# Patient Record
Sex: MALE | Race: OTHER | HISPANIC OR LATINO | Employment: FULL TIME | URBAN - METROPOLITAN AREA
[De-identification: names, ages, dates, MRNs, and addresses within clinical notes are randomized per-mention and may not be internally consistent; named-entity substitution may affect disease eponyms.]

---

## 2021-09-01 ENCOUNTER — TELEPHONE (OUTPATIENT)
Dept: PAIN MEDICINE | Facility: CLINIC | Age: 46
End: 2021-09-01

## 2021-09-01 VITALS
WEIGHT: 197 LBS | DIASTOLIC BLOOD PRESSURE: 93 MMHG | HEIGHT: 72 IN | BODY MASS INDEX: 26.68 KG/M2 | SYSTOLIC BLOOD PRESSURE: 146 MMHG | HEART RATE: 65 BPM

## 2021-09-01 DIAGNOSIS — M51.26 PROTRUSION OF LUMBAR INTERVERTEBRAL DISC: Primary | ICD-10-CM

## 2021-09-01 PROCEDURE — 99204 OFFICE O/P NEW MOD 45 MIN: CPT | Performed by: ORTHOPAEDIC SURGERY

## 2021-09-01 RX ORDER — PREDNISONE 20 MG/1
20 TABLET ORAL
Qty: 15 TABLET | Refills: 0 | Status: SHIPPED | OUTPATIENT
Start: 2021-09-01 | End: 2021-09-07 | Stop reason: ALTCHOICE

## 2021-09-01 NOTE — LETTER
September 1, 2021     Patient: Vance Robison   YOB: 1975   Date of Visit: 9/1/2021       To Whom it May Concern:    Vance Nipple is under my professional care  He was seen in my office on 9/1/2021  No work at this time  If you have any questions or concerns, please don't hesitate to call           Sincerely,          Delia Smith DO        CC: No Recipients

## 2021-09-01 NOTE — TELEPHONE ENCOUNTER
Pt sees Dr Félix Cuevas    Pt was referred to see Dr Alexandra Bo however next available is not until 10/4    Pt wife is requesting to know if Dr Félix Cuevas can prescribe something for him until the meantime    Please advise  Pt wife Rafael Perez can be reached at [phone]

## 2021-09-01 NOTE — TELEPHONE ENCOUNTER
I already gave him a steroid, lets see if that works  I do not prescribe anything stronger than what he has already been given

## 2021-09-01 NOTE — PROGRESS NOTES
Assessment/Plan:  1  Protrusion of lumbar intervertebral disc  predniSONE 20 mg tablet    Ambulatory referral to Pain Management       Jarrett Chandra has low back pain and left lumbar radiculopathy  His MRI demonstrates a disc protrusion at L4-5 and L5-S1  This correlates with his symptoms of S1 radiculopathy down the left leg  I recommended that he follow-up with pain management to consider a interventional treatment such as an epidural injection at this time  I gave him a note excusing him from work at this time  He will follow-up with pain management going forward and ultimately be released back to work under their guidance  Subjective:   Anselmo Dobbs is a 42 y o  male who presents this for evaluation for a low back injury  This is worker's compensation injury  He had a low back injury at work in early August when he was lifting heavy piece of equipment off of a truck  He felt a tearing sensation in his back  He had immediate pain and discomfort  He went to an urgent care where x-rays showed no clear abnormality  He eventually was sent for physical therapy and was given pain medication and nothing seemed to help  Therapy made his symptoms worse  He then had an MRI of the lumbar spine which was recently completed  He was referred for orthopedic evaluation as well  Today he has pain that is sharp and severe in the left lumbar paraspinal region  He states it radiates down the left leg into the heel on the left side  He occasionally gets stiffness and pain in the right side the left is much worse  He does admit to increased pain especially when trying to go to the bathroom and defecation is is particularly difficult  He has increased pain with this as well  He denies any incontinence however  He denies any saddle paresthesias  He does have a history of disc herniation and lumbar spine fusion at L5 S1-9 years ago    He states following surgery he had complete resolution of his symptoms that time until recently in the pain feels very similar  He describes a weakness and heaviness in his left leg that has not been relieved by any recent treatment with oral anti-inflammatories and therapy  Review of Systems   Constitutional: Negative for chills, fever and unexpected weight change  HENT: Negative for hearing loss, nosebleeds and sore throat  Eyes: Negative for pain, redness and visual disturbance  Respiratory: Negative for cough, shortness of breath and wheezing  Cardiovascular: Negative for chest pain, palpitations and leg swelling  Gastrointestinal: Negative for abdominal pain, nausea and vomiting  Endocrine: Negative for polyphagia and polyuria  Genitourinary: Negative for dysuria and hematuria  Musculoskeletal:        See HPI   Skin: Negative for rash and wound  Neurological: Positive for numbness  Negative for dizziness and headaches  Psychiatric/Behavioral: Negative for decreased concentration and suicidal ideas  The patient is not nervous/anxious  History reviewed  No pertinent past medical history  Past Surgical History:   Procedure Laterality Date    LUMBAR FUSION         History reviewed  No pertinent family history  Social History     Occupational History    Not on file   Tobacco Use    Smoking status: Current Every Day Smoker    Smokeless tobacco: Never Used    Tobacco comment: 1 pack a day    Vaping Use    Vaping Use: Never assessed   Substance and Sexual Activity    Alcohol use: Never    Drug use: Never    Sexual activity: Not on file         Current Outpatient Medications:     Naproxen Sodium (ALEVE PO), Take by mouth, Disp: , Rfl:     No Known Allergies    Objective:  Vitals:    09/01/21 1342   BP: 146/93   Pulse: 65       Back Exam     Tenderness   Back tenderness location: Tenderness to palpation over left lumbar paraspinal region      Range of Motion   Extension: normal   Flexion:  60 abnormal     Muscle Strength   Right Quadriceps: 5/5   Left Quadriceps:  4/5   Right Hamstrings:  5/5   Left Hamstrings:  4/5     Tests   Straight leg raise right: positive at 90 deg  Straight leg raise left: positive at 70 deg    Reflexes   Patellar reflexes: Patellar reflex 3/4 on the left and 2/4 on the right  Other   Sensation: decreased (Decreased sensation to light touch over left lower extremity compared to right)  Gait: normal   Erythema: no back redness            Physical Exam  Vitals reviewed  Constitutional:       Appearance: He is well-developed  HENT:      Head: Normocephalic and atraumatic  Eyes:      Conjunctiva/sclera: Conjunctivae normal       Pupils: Pupils are equal, round, and reactive to light  Cardiovascular:      Rate and Rhythm: Normal rate  Pulmonary:      Effort: Pulmonary effort is normal  No respiratory distress  Musculoskeletal:      Cervical back: Normal range of motion and neck supple  Lumbar back: Positive right straight leg raise test and positive left straight leg raise test       Comments: As noted in HPI   Skin:     General: Skin is warm and dry  Neurological:      Mental Status: He is alert and oriented to person, place, and time     Psychiatric:         Behavior: Behavior normal          I have personally reviewed pertinent films in PACS and my interpretation is as follows:  MRI of the lumbar spine demonstrates disc bulge at L4-5 and disc protrusion at L5-S1 compressing the L5 and S1 nerve roots on the left

## 2021-09-01 NOTE — TELEPHONE ENCOUNTER
Patient is being referred to [facility] by Dr Jack Kramer this is a Workmans Comp but patients personal Medical insurance does not Par [facility] with us  Fely Aquino is asking what can be done, because this is a workmans comp issue and patient needs to be seen ASAP      Please advise    [phone]

## 2021-09-02 NOTE — TELEPHONE ENCOUNTER
Tarik Crimes was given above information from Dr [facility] and verbalized understanding  Will call back if no improvement

## 2021-09-04 NOTE — TELEPHONE ENCOUNTER
Deborah Hendrix    [phone]    Patient states that he is having 9/10 lower back pain  He started Prednisone on Wednesday and it has not helped  Can something be called into [facility] @ [phone]    I sent a [place] Text to  on call

## 2021-09-08 ENCOUNTER — TELEPHONE (OUTPATIENT)
Dept: OBGYN CLINIC | Facility: HOSPITAL | Age: 46
End: 2021-09-08

## 2021-09-08 NOTE — TELEPHONE ENCOUNTER
Patient sees Dr Jayne Oswald  [employer] is calling for 9/1 office notes        Faxed to [phone] per request

## 2021-09-15 ENCOUNTER — TELEPHONE (OUTPATIENT)
Dept: OBGYN CLINIC | Facility: HOSPITAL | Age: 46
End: 2021-09-15

## 2021-09-15 NOTE — TELEPHONE ENCOUNTER
DR Clifford Holland  RE: Fax OVN [phone]    Office notes were e-faxed to Fernando Cuevas from [employer]    FAX# [phone]   #    [facility] # [phone]   [id number]

## 2021-10-04 ENCOUNTER — TELEPHONE (OUTPATIENT)
Dept: OBGYN CLINIC | Facility: HOSPITAL | Age: 46
End: 2021-10-04

## 2021-10-18 ENCOUNTER — CONSULT (OUTPATIENT)
Dept: PAIN MEDICINE | Facility: CLINIC | Age: 46
End: 2021-10-18
Payer: OTHER MISCELLANEOUS

## 2021-10-18 ENCOUNTER — TELEPHONE (OUTPATIENT)
Dept: PAIN MEDICINE | Facility: CLINIC | Age: 46
End: 2021-10-18

## 2021-10-18 VITALS
SYSTOLIC BLOOD PRESSURE: 150 MMHG | HEIGHT: 72 IN | WEIGHT: 208 LBS | BODY MASS INDEX: 28.17 KG/M2 | DIASTOLIC BLOOD PRESSURE: 102 MMHG | HEART RATE: 60 BPM

## 2021-10-18 DIAGNOSIS — M48.061 SPINAL STENOSIS OF LUMBAR REGION WITHOUT NEUROGENIC CLAUDICATION: ICD-10-CM

## 2021-10-18 DIAGNOSIS — M54.16 LUMBAR RADICULOPATHY: ICD-10-CM

## 2021-10-18 DIAGNOSIS — M96.1 LUMBAR POST-LAMINECTOMY SYNDROME: ICD-10-CM

## 2021-10-18 DIAGNOSIS — G89.4 CHRONIC PAIN SYNDROME: Primary | ICD-10-CM

## 2021-10-18 DIAGNOSIS — G89.29 CHRONIC LEFT-SIDED LOW BACK PAIN WITH LEFT-SIDED SCIATICA: ICD-10-CM

## 2021-10-18 DIAGNOSIS — M54.42 CHRONIC LEFT-SIDED LOW BACK PAIN WITH LEFT-SIDED SCIATICA: ICD-10-CM

## 2021-10-18 DIAGNOSIS — M48.062 SPINAL STENOSIS OF LUMBAR REGION WITH NEUROGENIC CLAUDICATION: Primary | ICD-10-CM

## 2021-10-18 PROCEDURE — 99244 OFF/OP CNSLTJ NEW/EST MOD 40: CPT | Performed by: ANESTHESIOLOGY

## 2021-10-18 RX ORDER — GABAPENTIN 300 MG/1
300 CAPSULE ORAL 3 TIMES DAILY
Qty: 90 CAPSULE | Refills: 0 | Status: SHIPPED | OUTPATIENT
Start: 2021-10-18 | End: 2021-11-08 | Stop reason: DRUGHIGH

## 2021-10-19 ENCOUNTER — TELEPHONE (OUTPATIENT)
Dept: PAIN MEDICINE | Facility: CLINIC | Age: 46
End: 2021-10-19

## 2021-10-19 DIAGNOSIS — M48.061 SPINAL STENOSIS OF LUMBAR REGION WITHOUT NEUROGENIC CLAUDICATION: ICD-10-CM

## 2021-10-19 DIAGNOSIS — M96.1 LUMBAR POST-LAMINECTOMY SYNDROME: ICD-10-CM

## 2021-10-19 DIAGNOSIS — M54.42 CHRONIC LEFT-SIDED LOW BACK PAIN WITH LEFT-SIDED SCIATICA: ICD-10-CM

## 2021-10-19 DIAGNOSIS — G89.4 CHRONIC PAIN SYNDROME: Primary | ICD-10-CM

## 2021-10-19 DIAGNOSIS — G89.29 CHRONIC LEFT-SIDED LOW BACK PAIN WITH LEFT-SIDED SCIATICA: ICD-10-CM

## 2021-10-19 DIAGNOSIS — M54.16 LUMBAR RADICULOPATHY: ICD-10-CM

## 2021-10-30 PROCEDURE — U0005 INFEC AGEN DETEC AMPLI PROBE: HCPCS | Performed by: ANESTHESIOLOGY

## 2021-10-30 PROCEDURE — U0003 INFECTIOUS AGENT DETECTION BY NUCLEIC ACID (DNA OR RNA); SEVERE ACUTE RESPIRATORY SYNDROME CORONAVIRUS 2 (SARS-COV-2) (CORONAVIRUS DISEASE [COVID-19]), AMPLIFIED PROBE TECHNIQUE, MAKING USE OF HIGH THROUGHPUT TECHNOLOGIES AS DESCRIBED BY CMS-2020-01-R: HCPCS | Performed by: ANESTHESIOLOGY

## 2021-11-05 ENCOUNTER — APPOINTMENT (OUTPATIENT)
Dept: RADIOLOGY | Facility: HOSPITAL | Age: 46
End: 2021-11-05
Payer: OTHER MISCELLANEOUS

## 2021-11-05 ENCOUNTER — HOSPITAL ENCOUNTER (OUTPATIENT)
Facility: AMBULARY SURGERY CENTER | Age: 46
Setting detail: OUTPATIENT SURGERY
Discharge: HOME | End: 2021-11-05
Attending: ANESTHESIOLOGY | Admitting: ANESTHESIOLOGY
Payer: OTHER MISCELLANEOUS

## 2021-11-05 VITALS
OXYGEN SATURATION: 99 % | RESPIRATION RATE: 18 BRPM | SYSTOLIC BLOOD PRESSURE: 138 MMHG | HEART RATE: 59 BPM | TEMPERATURE: 97.6 F | DIASTOLIC BLOOD PRESSURE: 95 MMHG

## 2021-11-05 PROCEDURE — 72020 X-RAY EXAM OF SPINE 1 VIEW: CPT

## 2021-11-05 PROCEDURE — 64484 NJX AA&/STRD TFRM EPI L/S EA: CPT | Performed by: ANESTHESIOLOGY

## 2021-11-05 PROCEDURE — 64483 NJX AA&/STRD TFRM EPI L/S 1: CPT | Performed by: ANESTHESIOLOGY

## 2021-11-05 RX ORDER — METHYLPREDNISOLONE ACETATE 80 MG/ML
INJECTION, SUSPENSION INTRA-ARTICULAR; INTRALESIONAL; INTRAMUSCULAR; SOFT TISSUE AS NEEDED
Status: DISCONTINUED | OUTPATIENT
Start: 2021-11-05 | End: 2021-11-05 | Stop reason: HOSPADM

## 2021-11-05 RX ORDER — BUPIVACAINE HYDROCHLORIDE 2.5 MG/ML
INJECTION, SOLUTION EPIDURAL; INFILTRATION; INTRACAUDAL AS NEEDED
Status: DISCONTINUED | OUTPATIENT
Start: 2021-11-05 | End: 2021-11-05 | Stop reason: HOSPADM

## 2021-11-05 RX ORDER — LIDOCAINE WITH 8.4% SOD BICARB 0.9%(10ML)
SYRINGE (ML) INJECTION AS NEEDED
Status: DISCONTINUED | OUTPATIENT
Start: 2021-11-05 | End: 2021-11-05 | Stop reason: HOSPADM

## 2021-11-08 RX ORDER — GABAPENTIN 600 MG/1
600 TABLET ORAL 3 TIMES DAILY
Qty: 90 TABLET | Refills: 0 | Status: SHIPPED | OUTPATIENT
Start: 2021-11-08 | End: 2021-12-02 | Stop reason: ALTCHOICE

## 2021-11-12 ENCOUNTER — TELEPHONE (OUTPATIENT)
Dept: PAIN MEDICINE | Facility: CLINIC | Age: 46
End: 2021-11-12

## 2021-11-12 NOTE — TELEPHONE ENCOUNTER
Pt reports no improvement post inj  Pain level 7-8/10  Pt aware I will call next week for an update

## 2021-11-18 NOTE — TELEPHONE ENCOUNTER
pts partner called stating that patient pain level is 8/10 and his legs are swollen and he cant sit down - pain increases with the slightest activity even with the increased dosage of gabentenpin

## 2021-11-19 NOTE — TELEPHONE ENCOUNTER
S/w pt, he said he is getting swelling in his leg (not b/l) and severe pain with any activity. RN advised pt that this would not be from the injection and most likely not due to the increase in the Gabapentin because it is only happening in one leg. Pt should report to ED for eval. Pt will f/u after.

## 2021-11-19 NOTE — TELEPHONE ENCOUNTER
I am not sure why his legs are swollen currently. It would not be from the injection. I think he should proceed to the ED to get ruled out for a blood clot.

## 2021-11-20 ENCOUNTER — HOSPITAL ENCOUNTER (EMERGENCY)
Facility: HOSPITAL | Age: 46
Discharge: HOME | End: 2021-11-20
Attending: EMERGENCY MEDICINE | Admitting: EMERGENCY MEDICINE
Payer: OTHER MISCELLANEOUS

## 2021-11-20 VITALS
DIASTOLIC BLOOD PRESSURE: 97 MMHG | WEIGHT: 215 LBS | RESPIRATION RATE: 18 BRPM | TEMPERATURE: 97.3 F | BODY MASS INDEX: 29.16 KG/M2 | SYSTOLIC BLOOD PRESSURE: 162 MMHG | OXYGEN SATURATION: 96 % | HEART RATE: 81 BPM

## 2021-11-20 DIAGNOSIS — M54.30 SCIATICA: Primary | ICD-10-CM

## 2021-11-20 LAB
ANION GAP SERPL CALCULATED.3IONS-SCNC: 9 MMOL/L (ref 4–13)
APTT PPP: 32 SECONDS (ref 23–37)
BASOPHILS # BLD AUTO: 0.06 THOUSANDS/ΜL (ref 0–0.1)
BASOPHILS NFR BLD AUTO: 1 % (ref 0–1)
BUN SERPL-MCNC: 26 MG/DL (ref 5–25)
CALCIUM SERPL-MCNC: 8.5 MG/DL (ref 8.3–10.1)
CHLORIDE SERPL-SCNC: 108 MMOL/L (ref 100–108)
CO2 SERPL-SCNC: 26 MMOL/L (ref 21–32)
CREAT SERPL-MCNC: 2.05 MG/DL (ref 0.6–1.3)
D DIMER PPP FEU-MCNC: <0.27 UG/ML FEU
EOSINOPHIL # BLD AUTO: 0.18 THOUSAND/ΜL (ref 0–0.61)
EOSINOPHIL NFR BLD AUTO: 2 % (ref 0–6)
ERYTHROCYTE [DISTWIDTH] IN BLOOD BY AUTOMATED COUNT: 13 % (ref 11.6–15.1)
GFR SERPL CREATININE-BSD FRML MDRD: 38 ML/MIN/1.73SQ M
GLUCOSE SERPL-MCNC: 100 MG/DL (ref 65–140)
HCT VFR BLD AUTO: 46 % (ref 36.5–49.3)
HGB BLD-MCNC: 15.9 G/DL (ref 12–17)
HOLD SPECIMEN: NORMAL
IMM GRANULOCYTES # BLD AUTO: 0.03 THOUSAND/UL (ref 0–0.2)
IMM GRANULOCYTES NFR BLD AUTO: 0 % (ref 0–2)
INR PPP: 0.93 (ref 0.84–1.19)
LYMPHOCYTES # BLD AUTO: 2.28 THOUSANDS/ΜL (ref 0.6–4.47)
LYMPHOCYTES NFR BLD AUTO: 24 % (ref 14–44)
MCH RBC QN AUTO: 31.7 PG (ref 26.8–34.3)
MCHC RBC AUTO-ENTMCNC: 34.6 G/DL (ref 31.4–37.4)
MCV RBC AUTO: 92 FL (ref 82–98)
MONOCYTES # BLD AUTO: 0.6 THOUSAND/ΜL (ref 0.17–1.22)
MONOCYTES NFR BLD AUTO: 6 % (ref 4–12)
NEUTROPHILS # BLD AUTO: 6.35 THOUSANDS/ΜL (ref 1.85–7.62)
NEUTS SEG NFR BLD AUTO: 67 % (ref 43–75)
NRBC BLD AUTO-RTO: 0 /100 WBCS
PLATELET # BLD AUTO: 224 THOUSANDS/UL (ref 149–390)
PMV BLD AUTO: 8.3 FL (ref 8.9–12.7)
POTASSIUM SERPL-SCNC: 4.4 MMOL/L (ref 3.5–5.3)
PROTHROMBIN TIME: 12.3 SECONDS (ref 11.6–14.5)
RBC # BLD AUTO: 5.01 MILLION/UL (ref 3.88–5.62)
SODIUM SERPL-SCNC: 143 MMOL/L (ref 136–145)
WBC # BLD AUTO: 9.5 THOUSAND/UL (ref 4.31–10.16)

## 2021-11-20 PROCEDURE — 99282 EMERGENCY DEPT VISIT SF MDM: CPT | Performed by: EMERGENCY MEDICINE

## 2021-11-20 PROCEDURE — 80048 BASIC METABOLIC PNL TOTAL CA: CPT | Performed by: EMERGENCY MEDICINE

## 2021-11-20 PROCEDURE — 85025 COMPLETE CBC W/AUTO DIFF WBC: CPT | Performed by: EMERGENCY MEDICINE

## 2021-11-20 PROCEDURE — 99284 EMERGENCY DEPT VISIT MOD MDM: CPT

## 2021-11-20 PROCEDURE — 85610 PROTHROMBIN TIME: CPT | Performed by: EMERGENCY MEDICINE

## 2021-11-20 PROCEDURE — 85379 FIBRIN DEGRADATION QUANT: CPT | Performed by: EMERGENCY MEDICINE

## 2021-11-20 PROCEDURE — 36415 COLL VENOUS BLD VENIPUNCTURE: CPT | Performed by: EMERGENCY MEDICINE

## 2021-11-20 PROCEDURE — 85730 THROMBOPLASTIN TIME PARTIAL: CPT | Performed by: EMERGENCY MEDICINE

## 2021-11-30 ENCOUNTER — TELEPHONE (OUTPATIENT)
Dept: PAIN MEDICINE | Facility: CLINIC | Age: 46
End: 2021-11-30

## 2021-12-02 ENCOUNTER — TELEPHONE (OUTPATIENT)
Dept: PAIN MEDICINE | Facility: CLINIC | Age: 46
End: 2021-12-02

## 2021-12-02 ENCOUNTER — OFFICE VISIT (OUTPATIENT)
Dept: PAIN MEDICINE | Facility: CLINIC | Age: 46
End: 2021-12-02
Payer: OTHER MISCELLANEOUS

## 2021-12-02 VITALS
WEIGHT: 216.6 LBS | BODY MASS INDEX: 29.34 KG/M2 | DIASTOLIC BLOOD PRESSURE: 87 MMHG | SYSTOLIC BLOOD PRESSURE: 129 MMHG | HEIGHT: 72 IN | HEART RATE: 85 BPM

## 2021-12-02 DIAGNOSIS — M54.42 CHRONIC LEFT-SIDED LOW BACK PAIN WITH LEFT-SIDED SCIATICA: ICD-10-CM

## 2021-12-02 DIAGNOSIS — G89.29 CHRONIC LEFT-SIDED LOW BACK PAIN WITH LEFT-SIDED SCIATICA: ICD-10-CM

## 2021-12-02 DIAGNOSIS — M48.062 SPINAL STENOSIS OF LUMBAR REGION WITH NEUROGENIC CLAUDICATION: ICD-10-CM

## 2021-12-02 DIAGNOSIS — M54.16 LUMBAR RADICULOPATHY: ICD-10-CM

## 2021-12-02 DIAGNOSIS — G89.4 CHRONIC PAIN SYNDROME: Primary | ICD-10-CM

## 2021-12-02 DIAGNOSIS — M96.1 LUMBAR POST-LAMINECTOMY SYNDROME: ICD-10-CM

## 2021-12-02 PROCEDURE — 99214 OFFICE O/P EST MOD 30 MIN: CPT | Performed by: ANESTHESIOLOGY

## 2021-12-02 RX ORDER — PREGABALIN 75 MG/1
75 CAPSULE ORAL 2 TIMES DAILY
Qty: 60 CAPSULE | Refills: 0 | Status: SHIPPED | OUTPATIENT
Start: 2021-12-02 | End: 2021-12-15 | Stop reason: ALTCHOICE

## 2021-12-23 PROCEDURE — U0003 INFECTIOUS AGENT DETECTION BY NUCLEIC ACID (DNA OR RNA); SEVERE ACUTE RESPIRATORY SYNDROME CORONAVIRUS 2 (SARS-COV-2) (CORONAVIRUS DISEASE [COVID-19]), AMPLIFIED PROBE TECHNIQUE, MAKING USE OF HIGH THROUGHPUT TECHNOLOGIES AS DESCRIBED BY CMS-2020-01-R: HCPCS | Performed by: ANESTHESIOLOGY

## 2021-12-23 PROCEDURE — U0005 INFEC AGEN DETEC AMPLI PROBE: HCPCS | Performed by: ANESTHESIOLOGY

## 2021-12-29 ENCOUNTER — HOSPITAL ENCOUNTER (OUTPATIENT)
Facility: AMBULARY SURGERY CENTER | Age: 46
Setting detail: OUTPATIENT SURGERY
Discharge: HOME | End: 2021-12-29
Attending: ANESTHESIOLOGY | Admitting: ANESTHESIOLOGY
Payer: OTHER MISCELLANEOUS

## 2021-12-29 ENCOUNTER — APPOINTMENT (OUTPATIENT)
Dept: RADIOLOGY | Facility: HOSPITAL | Age: 46
End: 2021-12-29
Payer: OTHER MISCELLANEOUS

## 2021-12-29 VITALS
SYSTOLIC BLOOD PRESSURE: 149 MMHG | OXYGEN SATURATION: 98 % | TEMPERATURE: 96.8 F | RESPIRATION RATE: 18 BRPM | DIASTOLIC BLOOD PRESSURE: 102 MMHG | HEIGHT: 72 IN | HEART RATE: 69 BPM | BODY MASS INDEX: 29.26 KG/M2 | WEIGHT: 216 LBS

## 2021-12-29 PROCEDURE — 72020 X-RAY EXAM OF SPINE 1 VIEW: CPT

## 2021-12-29 PROCEDURE — 64483 NJX AA&/STRD TFRM EPI L/S 1: CPT | Performed by: ANESTHESIOLOGY

## 2021-12-29 PROCEDURE — 64484 NJX AA&/STRD TFRM EPI L/S EA: CPT | Performed by: ANESTHESIOLOGY

## 2021-12-29 RX ORDER — BUPIVACAINE HYDROCHLORIDE 2.5 MG/ML
INJECTION, SOLUTION EPIDURAL; INFILTRATION; INTRACAUDAL AS NEEDED
Status: DISCONTINUED | OUTPATIENT
Start: 2021-12-29 | End: 2021-12-29 | Stop reason: HOSPADM

## 2021-12-29 RX ORDER — LIDOCAINE WITH 8.4% SOD BICARB 0.9%(10ML)
SYRINGE (ML) INJECTION AS NEEDED
Status: DISCONTINUED | OUTPATIENT
Start: 2021-12-29 | End: 2021-12-29 | Stop reason: HOSPADM

## 2021-12-29 RX ORDER — METHYLPREDNISOLONE ACETATE 80 MG/ML
INJECTION, SUSPENSION INTRA-ARTICULAR; INTRALESIONAL; INTRAMUSCULAR; SOFT TISSUE AS NEEDED
Status: DISCONTINUED | OUTPATIENT
Start: 2021-12-29 | End: 2021-12-29 | Stop reason: HOSPADM

## 2022-01-05 ENCOUNTER — TELEPHONE (OUTPATIENT)
Dept: PAIN MEDICINE | Facility: CLINIC | Age: 47
End: 2022-01-05

## 2022-02-03 ENCOUNTER — TELEPHONE (OUTPATIENT)
Dept: PAIN MEDICINE | Facility: CLINIC | Age: 47
End: 2022-02-03

## 2022-02-07 ENCOUNTER — OFFICE VISIT (OUTPATIENT)
Dept: PAIN MEDICINE | Facility: CLINIC | Age: 47
End: 2022-02-07
Payer: OTHER MISCELLANEOUS

## 2022-02-07 VITALS
SYSTOLIC BLOOD PRESSURE: 118 MMHG | BODY MASS INDEX: 30.61 KG/M2 | WEIGHT: 226 LBS | DIASTOLIC BLOOD PRESSURE: 79 MMHG | HEIGHT: 72 IN | HEART RATE: 99 BPM

## 2022-02-07 DIAGNOSIS — M96.1 LUMBAR POST-LAMINECTOMY SYNDROME: ICD-10-CM

## 2022-02-07 DIAGNOSIS — G89.4 CHRONIC PAIN SYNDROME: Primary | ICD-10-CM

## 2022-02-07 DIAGNOSIS — M54.16 LUMBAR RADICULOPATHY: ICD-10-CM

## 2022-02-07 DIAGNOSIS — M48.062 SPINAL STENOSIS OF LUMBAR REGION WITH NEUROGENIC CLAUDICATION: ICD-10-CM

## 2022-02-07 PROCEDURE — 99214 OFFICE O/P EST MOD 30 MIN: CPT | Performed by: NURSE PRACTITIONER

## 2022-02-07 NOTE — LETTER
February 7, 2022     Patient: Sara Danielson   YOB: 1975   Date of Visit: 2/7/2022       To Whom it May Concern:    Sara Danielson is under my professional care  He was seen in my office on 2/7/2022  He will continue to follow what he has been doing  If you have any questions or concerns, please don't hesitate to call           Sincerely,          PETRA TRIPP        CC: No Recipients

## 2022-02-07 NOTE — PROGRESS NOTES
Pain Medicine Follow-Up Note    Assessment:  1  Chronic pain syndrome    2  Lumbar post-laminectomy syndrome    3  Lumbar radiculopathy    4  Spinal stenosis of lumbar region with neurogenic claudication        Plan:  Orders Placed This Encounter   Procedures    Ambulatory referral to Neurosurgery     Standing Status:   Future     Standing Expiration Date:   2/7/2023     Referral Priority:   Routine     Referral Type:   Consult - AMB     Referral Reason:   Specialty Services Required     Requested Specialty:   Neurosurgery     Number of Visits Requested:   1     Expiration Date:   2/7/2023       No orders of the defined types were placed in this encounter  My impressions and treatment recommendations were discussed in detail with the patient who verbalized understanding and had no further questions  The patient was seen in the office today for follow-up after his left L5-S1 transforaminal epidural steroid injection on 12/29/2021  He states that unfortunately this injection did not do anything to help his pain symptoms  I did have a thorough discussion with the patient about a spinal cord stimulator trial and he was presented with information on the [employer] spinal cord stimulator to review at home  Initially, he stated that he did not want to pursue this as an option because he had a 3year-old daughter at home and could not be laid up for any length of time  He was started on Lyrica at the last office visit and says that he had to stop taking it because it was doing absolutely nothing to help this pain symptoms and it caused him to have lower extremity edema  The patient was also requesting a referral to Neurosurgery for re-evaluation of his chronic pain so 1 was provided to him at this visit  He will follow-up with Dr Beba George on an as-needed basis    And he will call the office if he decides to move forward with the spinal cord stimulator and I will put in an order for thoracic spine MRI and a referral for a psych eval     Follow-up is planned in as needed time or sooner as warranted  Discharge instructions were provided  I personally saw and examined the patient and I agree with the above discussed plan of care  History of Present Illness:    Shashank Cordova is a 42 y o  male who presents to [facility] for interval re-evaluation of the above stated pain complaints  The patient has a past medical and chronic pain history as outlined in the assessment section  He was last seen on 12/02/2021  He reports a pain score of 8-9/10 states that his pain is constant and worse in the morning  He describes the quality as sharp, shooting with numbness and pins and needles  States that his pain is in his low back and radiates down his left leg  Other than as stated above, the patient denies any interval changes in medications, medical condition, mental condition, symptoms, or allergies since the last office visit  Review of Systems:    Review of Systems   Respiratory: Negative for shortness of breath  Cardiovascular: Negative for chest pain  Gastrointestinal: Negative for constipation, diarrhea, nausea and vomiting  Musculoskeletal: Positive for gait problem  Negative for arthralgias, joint swelling and myalgias  Skin: Negative for rash  Neurological: Negative for dizziness, seizures and weakness  All other systems reviewed and are negative  Patient Active Problem List   Diagnosis    Chronic pain syndrome    Chronic left-sided low back pain with left-sided sciatica    Spinal stenosis of lumbar region with neurogenic claudication    Lumbar radiculopathy    Lumbar post-laminectomy syndrome       Past Medical History:   Diagnosis Date    Chronic back pain        Past Surgical History:   Procedure Laterality Date    EPIDURAL BLOCK INJECTION Left 11/5/2021    Procedure: L5 S1 transforaminal epdirual steroid injection ( [id number]);   Surgeon: Addie Mackey MD;  Location: [facility] MAIN OR;  Service: Pain Management     EPIDURAL BLOCK INJECTION Left 12/29/2021    Procedure: L5 S1 transforaminal epidural steroid injection ( [id number]); Surgeon: Addie Mackey MD;  Location: [facility] MAIN OR;  Service: Pain Management     LUMBAR FUSION         History reviewed  No pertinent family history  Social History     Occupational History    Not on file   Tobacco Use    Smoking status: Current Every Day Smoker     Packs/day: 1 00    Smokeless tobacco: Never Used    Tobacco comment: 1 pack a day    Vaping Use    Vaping Use: Never used   Substance and Sexual Activity    Alcohol use: Never    Drug use: Never    Sexual activity: Not on file       No current outpatient medications on file  No Known Allergies    Physical Exam:    /79   Pulse 99   Ht 6' (1 829 m)   Wt 103 kg (226 lb)   BMI 30 65 kg/m²     Constitutional:normal, well developed, well nourished, alert, in no distress and non-toxic and no overt pain behavior    Eyes:anicteric  HEENT:grossly intact  Neck:supple, symmetric, trachea midline and no masses   Pulmonary:even and unlabored  Cardiovascular:No edema or pitting edema present  Skin:Normal without rashes or lesions and well hydrated  Psychiatric:Mood and affect appropriate  Neurologic:Cranial Nerves II-XII grossly intact  Musculoskeletal:antalgic      Imaging  No orders to display         Orders Placed This Encounter   Procedures    Ambulatory referral to Neurosurgery

## 2022-02-08 DIAGNOSIS — M51.16 LUMBAR DISC DISEASE WITH RADICULOPATHY: Primary | ICD-10-CM

## 2022-02-08 RX ORDER — DULOXETIN HYDROCHLORIDE 30 MG/1
30 CAPSULE, DELAYED RELEASE ORAL DAILY
Qty: 30 CAPSULE | Refills: 1 | Status: SHIPPED | OUTPATIENT
Start: 2022-02-08

## 2022-02-08 NOTE — TELEPHONE ENCOUNTER
pts partner called asking for pain medication for the pain in his back  to be called into [facility] DR ROXANNE ESCAMILLA on file- thank you      [phone]

## 2022-02-08 NOTE — TELEPHONE ENCOUNTER
Spoke to pt, advised the same  Writing nurse reviewed s/e of medication as well  Pt verbalized understanding of instructions

## 2022-02-08 NOTE — TELEPHONE ENCOUNTER
Spoke to pt regarding pain in lower back traveling down left leg  Pt stated that he has tried naproxen, gabapentin, lyrica as well as heat with no relief  Pt stated he was seen in office on 2/7 and is not interested in STIM at this time  Please advise

## 2022-02-08 NOTE — TELEPHONE ENCOUNTER
Please let the patient know that I sent a prescription for duloxetine 30 mg to the pharmacy on file  Please review side effects with him and make sure he knows that this is a starting dose and that he needs to be titrated up to a dose that will be most effective in helping his radiating leg pain

## 2022-05-16 ENCOUNTER — APPOINTMENT (EMERGENCY)
Dept: RADIOLOGY | Facility: HOSPITAL | Age: 47
End: 2022-05-16
Payer: COMMERCIAL

## 2022-05-16 ENCOUNTER — HOSPITAL ENCOUNTER (EMERGENCY)
Facility: HOSPITAL | Age: 47
Discharge: HOME | End: 2022-05-16
Attending: EMERGENCY MEDICINE
Payer: COMMERCIAL

## 2022-05-16 VITALS
SYSTOLIC BLOOD PRESSURE: 118 MMHG | DIASTOLIC BLOOD PRESSURE: 73 MMHG | WEIGHT: 222.8 LBS | TEMPERATURE: 98.7 F | HEART RATE: 95 BPM | OXYGEN SATURATION: 95 % | RESPIRATION RATE: 18 BRPM | BODY MASS INDEX: 30.22 KG/M2

## 2022-05-16 DIAGNOSIS — U07.1 COVID-19: Primary | ICD-10-CM

## 2022-05-16 LAB
FLUAV RNA RESP QL NAA+PROBE: NEGATIVE
FLUBV RNA RESP QL NAA+PROBE: NEGATIVE
RSV RNA RESP QL NAA+PROBE: NEGATIVE
SARS-COV-2 RNA RESP QL NAA+PROBE: POSITIVE

## 2022-05-16 PROCEDURE — 0241U HB NFCT DS VIR RESP RNA 4 TRGT: CPT | Performed by: EMERGENCY MEDICINE

## 2022-05-16 PROCEDURE — 99284 EMERGENCY DEPT VISIT MOD MDM: CPT | Performed by: EMERGENCY MEDICINE

## 2022-05-16 PROCEDURE — 94640 AIRWAY INHALATION TREATMENT: CPT

## 2022-05-16 PROCEDURE — 71045 X-RAY EXAM CHEST 1 VIEW: CPT

## 2022-05-16 PROCEDURE — 99285 EMERGENCY DEPT VISIT HI MDM: CPT

## 2022-05-16 RX ORDER — PREDNISONE 20 MG/1
60 TABLET ORAL ONCE
Status: COMPLETED | OUTPATIENT
Start: 2022-05-16 | End: 2022-05-16

## 2022-05-16 RX ORDER — IPRATROPIUM BROMIDE AND ALBUTEROL SULFATE 2.5; .5 MG/3ML; MG/3ML
3 SOLUTION RESPIRATORY (INHALATION) ONCE
Status: COMPLETED | OUTPATIENT
Start: 2022-05-16 | End: 2022-05-16

## 2022-05-16 RX ORDER — ALBUTEROL SULFATE 90 UG/1
2 AEROSOL, METERED RESPIRATORY (INHALATION) EVERY 4 HOURS PRN
Qty: 18 G | Refills: 0 | Status: SHIPPED | OUTPATIENT
Start: 2022-05-16

## 2022-05-16 RX ORDER — ACETAMINOPHEN 325 MG/1
650 TABLET ORAL ONCE
Status: COMPLETED | OUTPATIENT
Start: 2022-05-16 | End: 2022-05-16

## 2022-05-16 RX ORDER — PREDNISONE 20 MG/1
40 TABLET ORAL DAILY
Qty: 10 TABLET | Refills: 0 | Status: SHIPPED | OUTPATIENT
Start: 2022-05-16 | End: 2022-05-21

## 2022-05-16 RX ADMIN — IPRATROPIUM BROMIDE AND ALBUTEROL SULFATE 3 ML: 2.5; .5 SOLUTION RESPIRATORY (INHALATION) at 20:19

## 2022-05-16 RX ADMIN — ACETAMINOPHEN 650 MG: 325 TABLET ORAL at 20:16

## 2022-05-16 RX ADMIN — PREDNISONE 60 MG: 20 TABLET ORAL at 21:49

## 2022-05-17 ENCOUNTER — TELEPHONE (OUTPATIENT)
Dept: FAMILY MEDICINE CLINIC | Facility: CLINIC | Age: 47
End: 2022-05-17

## 2022-05-17 NOTE — ED PROVIDER NOTES
History  Chief Complaint   Patient presents with    Shortness of Breath     States SOB, chills, that started yesterday  States he has a smokers cough  Temp 100 6 in triage  Patient presents for evaluation of shortness of breath and chills starting yesterday  Patient states he has a cough with calls a smoker's cough  Was unaware they have fever  Patient feels some tightness in his chest as well  Patient is not vaccinated  Patient is a daily smoker  History provided by:  Patient   used: No    Shortness of Breath  Associated symptoms: cough and fever    Associated symptoms: no abdominal pain, no chest pain, no ear pain, no rash, no sore throat and no vomiting        Prior to Admission Medications   Prescriptions Last Dose Informant Patient Reported? Taking? DULoxetine (CYMBALTA) 30 mg delayed release capsule   No No   Sig: Take 1 capsule (30 mg total) by mouth daily      Facility-Administered Medications: None       Past Medical History:   Diagnosis Date    Chronic back pain        Past Surgical History:   Procedure Laterality Date    EPIDURAL BLOCK INJECTION Left 11/5/2021    Procedure: L5 S1 transforaminal epdirual steroid injection ( [id number]); Surgeon: Tiny Romberg, MD;  [place] [facility] [place];  Service: Pain Management     EPIDURAL BLOCK INJECTION Left 12/29/2021    Procedure: L5 S1 transforaminal epidural steroid injection ( [id number]); Surgeon: Tiny Romberg, MD;  Location: [facility] OR;  Service: Pain Management     LUMBAR FUSION         History reviewed  No pertinent family history  I have reviewed and agree with the history as documented      E-Cigarette/Vaping    E-Cigarette Use Never User      E-Cigarette/Vaping Substances    Nicotine No     THC No     CBD No     Flavoring No     Other No     Unknown No      Social History     Tobacco Use    Smoking status: Current Every Day Smoker     Packs/day: 1 00    Smokeless tobacco: Never Used    Tobacco comment: 1 pack a day    Vaping Use    Vaping Use: Never used   Substance Use Topics    Alcohol use: Never    Drug use: Never       Review of Systems   Constitutional: Positive for chills and fever  HENT: Positive for congestion  Negative for ear pain and sore throat  Eyes: Negative for pain and visual disturbance  Respiratory: Positive for cough, chest tightness and shortness of breath  Cardiovascular: Negative for chest pain and palpitations  Gastrointestinal: Negative for abdominal pain and vomiting  Genitourinary: Negative for dysuria and hematuria  Musculoskeletal: Negative for arthralgias and back pain  Skin: Negative for color change and rash  Neurological: Negative for seizures and syncope  All other systems reviewed and are negative  Physical Exam  Physical Exam  Vitals and nursing note reviewed  Constitutional:       General: He is not in acute distress  HENT:      Head: Atraumatic  Right Ear: External ear normal       Left Ear: External ear normal       Nose: Nose normal       Mouth/Throat:      Mouth: Mucous membranes are moist       Pharynx: Oropharynx is clear  Eyes:      General: No scleral icterus  Conjunctiva/sclera: Conjunctivae normal    Cardiovascular:      Rate and Rhythm: Normal rate and regular rhythm  Pulses: Normal pulses  Pulmonary:      Effort: Pulmonary effort is normal  No respiratory distress  Breath sounds: Wheezing present  Comments: Bilateral expiratory wheezing  Abdominal:      General: Abdomen is flat  Bowel sounds are normal  There is no distension  Palpations: Abdomen is soft  Tenderness: There is no abdominal tenderness  There is no guarding or rebound  Musculoskeletal:         General: No deformity  Normal range of motion  Skin:     Capillary Refill: Capillary refill takes less than 2 seconds  Findings: No rash  Neurological:      General: No focal deficit present        Mental Status: He is alert and oriented to person, place, and time  Vital Signs  ED Triage Vitals [05/16/22 1939]   Temperature Pulse Respirations Blood Pressure SpO2   (!) 100 6 °F (38 1 °C) 82 20 132/91 98 %      Temp Source Heart Rate Source Patient Position - Orthostatic VS BP Location FiO2 (%)   Tympanic Monitor Sitting Left arm --      Pain Score       --           Vitals:    05/16/22 1939 05/16/22 2151   BP: 132/91 118/73   Pulse: 82 95   Patient Position - Orthostatic VS: Sitting Sitting         Visual Acuity      ED Medications  Medications   ipratropium-albuterol (DUO-NEB) 0 5-2 5 mg/3 mL inhalation solution 3 mL (3 mL Nebulization Given 5/16/22 2019)   acetaminophen (TYLENOL) tablet 650 mg (650 mg Oral Given 5/16/22 2016)   predniSONE tablet 60 mg (60 mg Oral Given 5/16/22 2149)       Diagnostic Studies  Results Reviewed     Procedure Component Value Units Date/Time    COVID/FLU/RSV - 2 hour TAT [[id number]]  (Abnormal) Collected: 05/16/22 2015    Lab Status: Final result Specimen: Nares from Nasopharyngeal Swab Updated: 05/16/22 2105     SARS-CoV-2 Positive     INFLUENZA A PCR Negative     INFLUENZA B PCR Negative     RSV PCR Negative    Narrative:      FOR PEDIATRIC PATIENTS - copy/paste COVID Guidelines URL to browser: [email or web address]    SARS-CoV-2 assay is a Nucleic Acid Amplification assay intended for the  qualitative detection of nucleic acid from SARS-CoV-2 in nasopharyngeal  swabs  Results are for the presumptive identification of SARS-CoV-2 RNA  Positive results are indicative of infection with SARS-CoV-2, the virus  causing COVID-19, but do not rule out bacterial infection or co-infection  with other viruses  Laboratories within the [place] and its  territories are required to report all positive results to the appropriate  public health authorities   Negative results do not preclude SARS-CoV-2  infection and should not be used as the sole basis for treatment or other  patient management decisions  Negative results must be combined with  clinical observations, patient history, and epidemiological information  This test has not been FDA cleared or approved  This test has been authorized by FDA under an Emergency Use Authorization  (EUA)  This test is only authorized for the duration of time the  declaration that circumstances exist justifying the authorization of the  emergency use of an in vitro diagnostic tests for detection of SARS-CoV-2  virus and/or diagnosis of COVID-19 infection under section 564(b)(1) of  the Act, 21 U  S C  194XKJ-5(G)(9), unless the authorization is terminated  or revoked sooner  The test has been validated but independent review by FDA  and CLIA is pending  Test performed using [employer] GeneXpert: This RT-PCR assay targets N2,  a region unique to SARS-CoV-2  A conserved region in the E-gene was chosen  for pan-Sarbecovirus detection which includes SARS-CoV-2  XR chest 1 view portable   Final Result by Muriel Webb MD (05/16 2133)      No acute cardiopulmonary disease  Workstation performed: [id number]                    Procedures  Procedures         ED Course                                             MDM  Number of Diagnoses or Management Options  COVID-19  Diagnosis management comments: Pulse ox 95% on room air indicating adequate oxygenation  CXR: NAD as read by me    Patient informed of positive COVID test results  On repeat exam the tightness in chest improved  Patient's oxygen saturation was 95%  Will refer the COVID follow-up team   Discussed CDC guidelines for isolation           Amount and/or Complexity of Data Reviewed  Clinical lab tests: ordered and reviewed  Tests in the radiology section of CPT®: ordered and reviewed  Decide to obtain previous medical records or to obtain history from someone other than the patient: yes  Review and summarize past medical records: yes  Independent visualization of images, tracings, or specimens: yes    Patient Progress  Patient progress: improved      Disposition  Final diagnoses:   COVID-19     Time reflects when diagnosis was documented in both MDM as applicable and the Disposition within this note     Time User Action Codes Description Comment    5/16/2022  9:14 PM Ema Mckee Add [U07 1] COVID-19       ED Disposition     ED Disposition   Discharge    Condition   Stable    Date/Time   Mon May 16, 2022  9:14 PM    [address] discharge to home/self care  Follow-up Information     Follow up With Specialties Details Why Contact Info Additional Information    [employer]  In 1 week [phone]       [address] [facility] Emergency Department Emergency Medicine  If symptoms worsen [address] [facility] [address]  [address] [address] Emergency Department, [place], [place], [place], [address]          Discharge Medication List as of 5/16/2022  9:16 PM      START taking these medications    Details   albuterol (PROVENTIL HFA,VENTOLIN HFA) 90 mcg/act inhaler Inhale 2 puffs every 4 (four) hours as needed for wheezing or shortness of breath, Starting Mon 5/16/2022, Normal      predniSONE 20 mg tablet Take 2 tablets (40 mg total) by mouth in the morning for 5 days  , Starting Mon 5/16/2022, Until Sat 5/21/2022, Normal         CONTINUE these medications which have NOT CHANGED    Details   DULoxetine (CYMBALTA) 30 mg delayed release capsule Take 1 capsule (30 mg total) by mouth daily, Starting Tue 2/8/2022, Normal             No discharge procedures on file      PDMP Review     None          ED Provider  Electronically Signed by           Skyler Araujo DO  05/16/22 0524

## 2022-05-17 NOTE — TELEPHONE ENCOUNTER
Spoke with patient, introduced myself and purpose of call  Patient states he received an inhaler and steroid at th ED yesterday and he feels like he does not need to follow up  Stressed the importance to patient, but denied  I made patient aware what symptoms to look out for and to visit ED if he worsens

## 2022-06-04 ENCOUNTER — OFFICE VISIT (OUTPATIENT)
Dept: URGENT CARE | Facility: CLINIC | Age: 47
End: 2022-06-04
Payer: COMMERCIAL

## 2022-06-04 VITALS — RESPIRATION RATE: 14 BRPM | TEMPERATURE: 98.6 F | HEART RATE: 103 BPM | OXYGEN SATURATION: 99 %

## 2022-06-04 DIAGNOSIS — R68.89 FLU-LIKE SYMPTOMS: Primary | ICD-10-CM

## 2022-06-04 PROCEDURE — 99213 OFFICE O/P EST LOW 20 MIN: CPT | Performed by: FAMILY MEDICINE

## 2022-06-04 NOTE — PROGRESS NOTES
[facility]        NAME: Dulce Maria Amaya is a 42 y o  male  : 1975    MRN: [id number]  DATE: 2022  TIME: 1:27 PM    Assessment and Plan   Flu-like symptoms [R68 89]  1  Flu-like symptoms       COVID highly unlikely having been positive 1 month ago  Advised supportive measures  Patient Instructions     Follow up with PCP in 3-5 days  Proceed to  ER if symptoms worsen  Chief Complaint     Chief Complaint   Patient presents with    Cold Like Symptoms     Pt presents with headache, body aches, nasal congestion, started yesterday         History of Present Illness       53-year-old male presents today due to 1 day of URI symptoms including nasal congestion, chills, headache and generalized myalgias  Is concern for possible viral infection including COVID-19  Reports that his son had URI symptoms prior to the onset of his  Tested positive for COVID-19 one month ago  Review of Systems   Review of Systems   Constitutional: Positive for chills  Negative for fever  HENT: Positive for congestion  Negative for rhinorrhea  Respiratory: Negative for cough, shortness of breath and wheezing  Cardiovascular: Negative for chest pain  Gastrointestinal: Negative for abdominal pain and nausea  Musculoskeletal: Positive for myalgias  Neurological: Positive for headaches  Negative for dizziness           Current Medications       Current Outpatient Medications:     albuterol (PROVENTIL HFA,VENTOLIN HFA) 90 mcg/act inhaler, Inhale 2 puffs every 4 (four) hours as needed for wheezing or shortness of breath (Patient not taking: Reported on 2022), Disp: 18 g, Rfl: 0    DULoxetine (CYMBALTA) 30 mg delayed release capsule, Take 1 capsule (30 mg total) by mouth daily (Patient not taking: Reported on 2022), Disp: 30 capsule, Rfl: 1    Current Allergies     Allergies as of 2022    (No Known Allergies)            The following portions of the patient's history were reviewed and updated as appropriate: allergies, current medications, past family history, past medical history, past social history, past surgical history and problem list      Past Medical History:   Diagnosis Date    Chronic back pain        Past Surgical History:   Procedure Laterality Date    EPIDURAL BLOCK INJECTION Left 11/5/2021    Procedure: L5 S1 transforaminal epdirual steroid injection ( [id number]); Surgeon: Estrella Saavedra MD;  Location: [facility] OR;  Service: Pain Management     EPIDURAL BLOCK INJECTION Left 12/29/2021    Procedure: L5 S1 transforaminal epidural steroid injection ( [id number]); Surgeon: Estrella Saavedra MD;  Location: [facility] OR;  Service: Pain Management     LUMBAR FUSION         History reviewed  No pertinent family history  Medications have been verified  Objective   Pulse 103   Temp 98 6 °F (37 °C)   Resp 14   SpO2 99%   No LMP for male patient  Physical Exam     Physical Exam  Vitals and nursing note reviewed  Constitutional:       General: He is in acute distress  Appearance: Normal appearance  He is normal weight  He is not ill-appearing, toxic-appearing or diaphoretic  HENT:      Head: Normocephalic and atraumatic  Mouth/Throat:      Mouth: Mucous membranes are moist       Pharynx: No posterior oropharyngeal erythema  Eyes:      General:         Right eye: No discharge  Left eye: No discharge  Conjunctiva/sclera: Conjunctivae normal    Cardiovascular:      Rate and Rhythm: Normal rate and regular rhythm  Pulmonary:      Effort: Pulmonary effort is normal  No respiratory distress  Breath sounds: Normal breath sounds  No wheezing, rhonchi or rales  Skin:     General: Skin is warm  Findings: No erythema  Neurological:      General: No focal deficit present  Mental Status: He is alert and oriented to person, place, and time     Psychiatric:         Mood and Affect: Mood normal          Behavior: Behavior normal          Thought Content:  Thought content normal          Judgment: Judgment normal

## 2023-12-12 ENCOUNTER — APPOINTMENT (EMERGENCY)
Dept: RADIOLOGY | Facility: HOSPITAL | Age: 48
End: 2023-12-12
Payer: COMMERCIAL

## 2023-12-12 ENCOUNTER — HOSPITAL ENCOUNTER (EMERGENCY)
Facility: HOSPITAL | Age: 48
Discharge: HOME | End: 2023-12-12
Attending: EMERGENCY MEDICINE
Payer: COMMERCIAL

## 2023-12-12 VITALS
HEART RATE: 80 BPM | OXYGEN SATURATION: 99 % | SYSTOLIC BLOOD PRESSURE: 141 MMHG | DIASTOLIC BLOOD PRESSURE: 90 MMHG | WEIGHT: 194.4 LBS | TEMPERATURE: 99.6 F | RESPIRATION RATE: 18 BRPM | BODY MASS INDEX: 26.37 KG/M2
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
## 2023-12-12 DIAGNOSIS — M54.9 EXACERBATION OF CHRONIC BACK PAIN: ICD-10-CM

## 2023-12-12 DIAGNOSIS — G89.29 EXACERBATION OF CHRONIC BACK PAIN: ICD-10-CM

## 2023-12-12 PROCEDURE — 99284 EMERGENCY DEPT VISIT MOD MDM: CPT | Performed by: EMERGENCY MEDICINE

## 2023-12-12 PROCEDURE — 99284 EMERGENCY DEPT VISIT MOD MDM: CPT

## 2023-12-12 PROCEDURE — 72100 X-RAY EXAM L-S SPINE 2/3 VWS: CPT

## 2023-12-12 RX ORDER — TRAMADOL HYDROCHLORIDE 50 MG/1
TABLET ORAL
Qty: 12 TABLET | Refills: 0 | Status: SHIPPED | OUTPATIENT
Start: 2023-12-12

## 2023-12-12 RX ORDER — CYCLOBENZAPRINE HCL 10 MG
10 TABLET ORAL 2 TIMES DAILY PRN
Qty: 10 TABLET | Refills: 0 | Status: SHIPPED | OUTPATIENT
Start: 2023-12-12

## 2023-12-12 RX ORDER — OXYCODONE HYDROCHLORIDE AND ACETAMINOPHEN 5; 325 MG/1; MG/1
1 TABLET ORAL ONCE
Status: COMPLETED | OUTPATIENT
Start: 2023-12-12 | End: 2023-12-12

## 2023-12-12 RX ORDER — CYCLOBENZAPRINE HCL 10 MG
10 TABLET ORAL ONCE
Status: COMPLETED | OUTPATIENT
Start: 2023-12-12 | End: 2023-12-12

## 2023-12-12 RX ORDER — PREDNISONE 10 MG/1
TABLET ORAL
Qty: 20 TABLET | Refills: 0 | Status: SHIPPED | OUTPATIENT
Start: 2023-12-12

## 2023-12-12 RX ADMIN — CYCLOBENZAPRINE 10 MG: 10 TABLET, FILM COATED ORAL at 14:05

## 2023-12-12 RX ADMIN — PREDNISONE 50 MG: 20 TABLET ORAL at 14:05

## 2023-12-12 RX ADMIN — OXYCODONE HYDROCHLORIDE AND ACETAMINOPHEN 1 TABLET: 5; 325 TABLET ORAL at 14:05

## 2023-12-12 NOTE — DISCHARGE INSTRUCTIONS
Pe 11/14 bw order needed please mail to Clay's home address    Try the medicines as prescribed.    You can also use tylenol, motrin, alternate ice and heat.    Rest as needed.

## 2023-12-12 NOTE — ED PROVIDER NOTES
History  Chief Complaint   Patient presents with    Fall     States he had back surgery 18 months ago. Slipped in mud yesterday and fell injurying lower back and left groin. Denies LOC.     47 yo male with history of chronic back pain s/p surgery/injections says he slipped and fell yesterday.  He landed onto his buttocks.  Since then has had worsening lower back pain radiating down both legs.  He did not hit head, no LOC.  No head, neck, chest or belly pain.  No new or different bowel or bladder problems.  No recent illness.  No relief with motrin.  Pt. Wants xrays done.      History provided by:  Patient   used: No    Fall  Associated symptoms: back pain    Associated symptoms: no neck pain and no vomiting        Prior to Admission Medications   Prescriptions Last Dose Informant Patient Reported? Taking?   DULoxetine (CYMBALTA) 30 mg delayed release capsule   No No   Sig: Take 1 capsule (30 mg total) by mouth daily   Patient not taking: Reported on 6/4/2022   albuterol (PROVENTIL HFA,VENTOLIN HFA) 90 mcg/act inhaler   No No   Sig: Inhale 2 puffs every 4 (four) hours as needed for wheezing or shortness of breath   Patient not taking: Reported on 6/4/2022      Facility-Administered Medications: None       Past Medical History:   Diagnosis Date    Chronic back pain        Past Surgical History:   Procedure Laterality Date    EPIDURAL BLOCK INJECTION Left 11/5/2021    Procedure: L5 S1 transforaminal epdirual steroid injection ( [id number]);  Surgeon: Dudley Mclain MD;  Location: [facility] MAIN OR;  Service: Pain Management     EPIDURAL BLOCK INJECTION Left 12/29/2021    Procedure: L5 S1 transforaminal epidural steroid injection ( [id number]);  Surgeon: Dudley Mclain MD;  Location: [facility] MAIN OR;  Service: Pain Management     LUMBAR FUSION         History reviewed. No pertinent family history.  I have reviewed and agree with the history as documented.    E-Cigarette/Vaping    E-Cigarette Use Never  User      E-Cigarette/Vaping Substances    Nicotine No     THC No     CBD No     Flavoring No     Other No     Unknown No      Social History     Tobacco Use    Smoking status: Every Day     Packs/day: 1.00     Types: Cigarettes    Smokeless tobacco: Never    Tobacco comments:     1 pack a day    Vaping Use    Vaping Use: Never used   Substance Use Topics    Alcohol use: Never    Drug use: Never       Review of Systems   Constitutional:  Negative for fever.   Respiratory:  Negative for cough.    Gastrointestinal:  Negative for constipation, diarrhea and vomiting.   Genitourinary:  Negative for difficulty urinating.   Musculoskeletal:  Positive for back pain. Negative for neck pain.   Skin:  Negative for rash and wound.       Physical Exam  Physical Exam  Vitals and nursing note reviewed.   Constitutional:       General: He is not in acute distress.     Appearance: He is well-developed. He is not ill-appearing or diaphoretic.   HENT:      Head: Normocephalic and atraumatic.   Cardiovascular:      Rate and Rhythm: Normal rate and regular rhythm.      Heart sounds: Normal heart sounds. No murmur heard.  Pulmonary:      Effort: Pulmonary effort is normal. No respiratory distress.      Breath sounds: Normal breath sounds.   Abdominal:      General: Bowel sounds are normal. There is no distension.      Palpations: Abdomen is soft.      Tenderness: There is no abdominal tenderness.   Musculoskeletal:         General: No tenderness or deformity. Normal range of motion.      Cervical back: Normal range of motion and neck supple. No tenderness.      Right lower leg: No edema.      Left lower leg: No edema.      Comments: + diffuse lumbar spine ttp, + pain with straight leg raise, patellar reflexes intact bilat   Skin:     General: Skin is warm and dry.      Coloration: Skin is not pale.      Findings: No erythema or rash.   Neurological:      General: No focal deficit present.      Mental Status: He is alert and oriented to  person, place, and time.      Cranial Nerves: No cranial nerve deficit.      Motor: No weakness.      Deep Tendon Reflexes: Reflexes normal.   Psychiatric:         Mood and Affect: Mood normal.         Behavior: Behavior normal.         Vital Signs  ED Triage Vitals [12/12/23 1249]   Temperature Pulse Respirations Blood Pressure SpO2   99.6 °F (37.6 °C) 80 18 141/90 99 %      Temp Source Heart Rate Source Patient Position - Orthostatic VS BP Location FiO2 (%)   Temporal Monitor Sitting Left arm --      Pain Score       10 - Worst Possible Pain           Vitals:    12/12/23 1249   BP: 141/90   Pulse: 80   Patient Position - Orthostatic VS: Sitting         Visual Acuity      ED Medications  Medications   predniSONE tablet 50 mg (has no administration in time range)   oxyCODONE-acetaminophen (PERCOCET) 5-325 mg per tablet 1 tablet (1 tablet Oral Given 12/12/23 1405)   cyclobenzaprine (FLEXERIL) tablet 10 mg (10 mg Oral Given 12/12/23 1405)       Diagnostic Studies  Results Reviewed       None                   XR lumbar spine 2 or 3 views   ED Interpretation by Marie Lorenzo MD (12/12 1405)   No acute finding                 Procedures  Procedures         ED Course                               SBIRT 22yo+      Flowsheet Row Most Recent Value   Initial Alcohol Screen: US AUDIT-C     1. How often do you have a drink containing alcohol? 0 Filed at: 12/12/2023 1251   Audit-C Score 0 Filed at: 12/12/2023 1251   LIO: How many times in the past year have you...    Used an illegal drug or used a prescription medication for non-medical reasons? Never Filed at: 12/12/2023 1251                      Medical Decision Making  Prior records reviewed.  No recent imaging.  Prescription monitoring reviewed.  Last pain med script was tramadol in June 2022.  Xrays ok.  Advised course of prednisone, flexeril, ultram prn.  Advised rest, alternate ice/heat, follow up outpt. PCP or pain doctor.    Amount and/or Complexity of Data  Reviewed  Radiology: ordered and independent interpretation performed.    Risk  Prescription drug management.             Disposition  Final diagnoses:   Fall, initial encounter   Exacerbation of chronic back pain     Time reflects when diagnosis was documented in both MDM as applicable and the Disposition within this note       Time User Action Codes Description Comment    12/12/2023  1:54 PM Marie Lorenzo Add [W19.XXXA] Fall, initial encounter     12/12/2023  1:54 PM Marie Lorenzo Add [M54.9,  G89.29] Exacerbation of chronic back pain           ED Disposition       ED Disposition   Discharge    Condition   Stable    Date/Time   Tue Dec 12, 2023 140    Comment   Loyd Canarttalita discharge to home/self care.                   Follow-up Information       Follow up With Specialties Details Why Contact Info    your doctor  Schedule an appointment as soon as possible for a visit               Patient's Medications   Discharge Prescriptions    CYCLOBENZAPRINE (FLEXERIL) 10 MG TABLET    Take 1 tablet (10 mg total) by mouth 2 (two) times a day as needed for muscle spasms       Start Date: 12/12/2023End Date: --       Order Dose: 10 mg       Quantity: 10 tablet    Refills: 0    PREDNISONE 10 MG TABLET    4 po once daily x2 days, then 3 po daily x2 days, then 2 po daily x2 days,then 1 po daily x2days       Start Date: 12/12/2023End Date: --       Order Dose: --       Quantity: 20 tablet    Refills: 0    TRAMADOL (ULTRAM) 50 MG TABLET    1-2 po q 6 hours prn pain       Start Date: 12/12/2023End Date: --       Order Dose: --       Quantity: 12 tablet    Refills: 0       No discharge procedures on file.    PDMP Review       None            ED Provider  Electronically Signed by             Marie Lorenzo MD  12/12/23 7521

## 2024-02-01 ENCOUNTER — OFFICE VISIT (OUTPATIENT)
Dept: PAIN MEDICINE | Facility: CLINIC | Age: 49
End: 2024-02-01
Payer: COMMERCIAL

## 2024-02-01 VITALS
BODY MASS INDEX: 26.58 KG/M2 | HEART RATE: 58 BPM | SYSTOLIC BLOOD PRESSURE: 130 MMHG | WEIGHT: 196 LBS | DIASTOLIC BLOOD PRESSURE: 84 MMHG

## 2024-02-01 DIAGNOSIS — M96.1 LUMBAR POST-LAMINECTOMY SYNDROME: ICD-10-CM

## 2024-02-01 DIAGNOSIS — M54.42 CHRONIC LEFT-SIDED LOW BACK PAIN WITH LEFT-SIDED SCIATICA: Primary | ICD-10-CM

## 2024-02-01 DIAGNOSIS — M48.062 SPINAL STENOSIS OF LUMBAR REGION WITH NEUROGENIC CLAUDICATION: ICD-10-CM

## 2024-02-01 DIAGNOSIS — G89.4 CHRONIC PAIN SYNDROME: ICD-10-CM

## 2024-02-01 DIAGNOSIS — G89.29 CHRONIC LEFT-SIDED LOW BACK PAIN WITH LEFT-SIDED SCIATICA: Primary | ICD-10-CM

## 2024-02-01 DIAGNOSIS — M54.16 LUMBAR RADICULOPATHY: ICD-10-CM

## 2024-02-01 PROCEDURE — 99214 OFFICE O/P EST MOD 30 MIN: CPT | Performed by: STUDENT IN AN ORGANIZED HEALTH CARE EDUCATION/TRAINING PROGRAM

## 2024-02-01 RX ORDER — TIZANIDINE 2 MG/1
2 TABLET ORAL EVERY 8 HOURS PRN
Qty: 90 TABLET | Refills: 0 | Status: SHIPPED | OUTPATIENT
Start: 2024-02-01 | End: 2024-03-02

## 2024-02-01 NOTE — PROGRESS NOTES
Pain Medicine Follow-Up Note    Assessment:  1. Chronic left-sided low back pain with left-sided sciatica    2. Lumbar radiculopathy    3. Lumbar post-laminectomy syndrome    4. Spinal stenosis of lumbar region with neurogenic claudication    5. Chronic pain syndrome      Patient is a pleasant 48-year-old male who presents as a follow-up visit after last being seen in our office On 12/29/2021 where he underwent a left L5-S1, S1 transforaminal epidural steroid injection under fluoroscopic guidance with Dr. Qureshi.  Patient most recently followed up with physical medicine and rehabilitation at [facility] who recommended patient follow-up with neurosurgery.  Since last visit patient's symptoms are better rating his pain is a 9 out of 10 on numeric rating scale.  The pain is worse throughout the day and the pain is constant, occurring 100% of the time.  The quality pain is burning, dull aching, sharp, throbbing, cramping, pressure-like, shooting, numbing in the midline lower back radiating down the bilateral lower extremities into the left foot.  Patient recently prescribed tramadol, Flexeril and prednisone which she is not taking.    On exam patient with tenderness to palpation of midline lumbar spine with positive straight leg raise on the left.  Additionally patient with weakness on exam and reduced sensation to light touch in the left lower extremity compared to the right lower extremity.  Patient symptoms likely secondary to his known lumbar spinal stenosis and lumbar postlaminectomy syndrome.  Given this we will make an ambulatory referral to physical therapy and start tizanidine 2 mg 3 times daily as needed.  Patient to follow-up in 6 weeks and if symptoms persist we will get an updated MRI of the lumbar spine at that time.  Patient does not benefit from epidural therapy x 2 so do not think will be beneficial to repeat this.  Additionally patient with side effects to multiple neuropathic agents  will hold off on starting anything today.    Plan:  Ambulatory referral to PT  Start tizanidine 2mg Tid prn  Follow up in six weeks. If symptoms persist can consider MRI lumbar spine  Orders Placed This Encounter   Procedures   • Ambulatory referral to Physical Therapy     Standing Status:   Future     Standing Expiration Date:   2/1/2025     Referral Priority:   Routine     Referral Type:   Physical Therapy     Referral Reason:   Specialty Services Required     Requested Specialty:   Physical Therapy     Number of Visits Requested:   1     Expiration Date:   2/1/2025       New Medications Ordered This Visit   Medications   • tiZANidine (ZANAFLEX) 2 mg tablet     Sig: Take 1 tablet (2 mg total) by mouth every 8 (eight) hours as needed (mild to moderate pain)     Dispense:  90 tablet     Refill:  0       My impressions and treatment recommendations were discussed in detail with the patient who verbalized understanding and had no further questions.        [place] Prescription Drug Monitoring Program report was reviewed and was appropriate  and [place] Prescription Drug Monitoring Program report was reviewed and was appropriate     Follow-up is planned in six weeks time or sooner as warranted.  Discharge instructions were provided. I personally saw and examined the patient and I agree with the above discussed plan of care.    History of Present Illness:    Loyd Mackay is a 48 y.o. male who presents to [facility] for interval re-evaluation of the above stated pain complaints. The patient has a past medical and chronic pain history as outlined in the assessment section. He was last seen on 12/29/2021.    Patient is a pleasant 48-year-old male who presents as a follow-up visit after last being seen in our office On 12/29/2021 where he underwent a left L5-S1, S1 transforaminal epidural steroid injection under fluoroscopic guidance with Dr. Qureshi.  Patient most recently followed up  with physical medicine and rehabilitation at [facility] who recommended patient follow-up with neurosurgery.  Since last visit patient's symptoms are better rating his pain is a 9 out of 10 on numeric rating scale.  The pain is worse throughout the day and the pain is constant, occurring 100% of the time.  The quality pain is burning, dull aching, sharp, throbbing, cramping, pressure-like, shooting, numbing in the midline lower back radiating down the bilateral lower extremities into the left foot.  Patient recently prescribed tramadol, Flexeril and prednisone which she is not taking.      Other than as stated above, the patient denies any interval changes in medications, medical condition, mental condition, symptoms, or allergies since the last office visit.         Review of Systems:    Review of Systems   Constitutional:  Negative for unexpected weight change.   HENT:  Negative for ear pain.    Eyes:  Negative for visual disturbance.   Respiratory:  Negative for shortness of breath and wheezing.    Gastrointestinal:  Negative for abdominal pain.   Musculoskeletal:  Positive for back pain and gait problem.        Decreased ROM, Muscle pain and weakness, joint stiffness and swelling and pain in the back and legs   Neurological:  Positive for weakness and numbness.   Psychiatric/Behavioral:  Positive for dysphoric mood and sleep disturbance. Negative for decreased concentration. The patient is nervous/anxious.          Past Medical History:   Diagnosis Date   • Chronic back pain        Past Surgical History:   Procedure Laterality Date   • EPIDURAL BLOCK INJECTION Left 11/5/2021    Procedure: L5 S1 transforaminal epdirual steroid injection ( [id number]);  Surgeon: Dudley Mclain MD;  Location: [facility] MAIN OR;  Service: Pain Management    • EPIDURAL BLOCK INJECTION Left 12/29/2021    Procedure: L5 S1 transforaminal epidural steroid injection ( [id number]);  Surgeon: Dudley Mclain MD;  Location:  [facility] MAIN OR;  Service: Pain Management    • LUMBAR FUSION         History reviewed. No pertinent family history.    Social History     Occupational History   • Not on file   Tobacco Use   • Smoking status: Every Day     Current packs/day: 1.00     Types: Cigarettes   • Smokeless tobacco: Never   • Tobacco comments:     1 pack a day    Vaping Use   • Vaping status: Never Used   Substance and Sexual Activity   • Alcohol use: Never   • Drug use: Never   • Sexual activity: Not on file         Current Outpatient Medications:   •  tiZANidine (ZANAFLEX) 2 mg tablet, Take 1 tablet (2 mg total) by mouth every 8 (eight) hours as needed (mild to moderate pain), Disp: 90 tablet, Rfl: 0  •  albuterol (PROVENTIL HFA,VENTOLIN HFA) 90 mcg/act inhaler, Inhale 2 puffs every 4 (four) hours as needed for wheezing or shortness of breath (Patient not taking: Reported on 2/1/2024), Disp: 18 g, Rfl: 0  •  DULoxetine (CYMBALTA) 30 mg delayed release capsule, Take 1 capsule (30 mg total) by mouth daily (Patient not taking: Reported on 6/4/2022), Disp: 30 capsule, Rfl: 1  •  predniSONE 10 mg tablet, 4 po once daily x2 days, then 3 po daily x2 days, then 2 po daily x2 days,then 1 po daily x2days (Patient not taking: Reported on 2/1/2024), Disp: 20 tablet, Rfl: 0  •  traMADol (ULTRAM) 50 mg tablet, 1-2 po q 6 hours prn pain (Patient not taking: Reported on 2/1/2024), Disp: 12 tablet, Rfl: 0    No Known Allergies    Physical Exam:    /84   Pulse 58   Wt 88.9 kg (196 lb)   BMI 26.58 kg/m²     Constitutional:normal, well developed, well nourished, alert, in no distress and non-toxic and no overt pain behavior.  Eyes:anicteric  HEENT:grossly intact  Neck:supple, symmetric, trachea midline and no masses   Pulmonary:even and unlabored  Cardiovascular:No edema or pitting edema present  Skin:Normal without rashes or lesions and well hydrated  Psychiatric:Mood and affect appropriate  Neurologic:Cranial Nerves II-XII grossly  intact  Musculoskeletal:antalgic gait. TTP in midline lumbar spine. +SLR on the left.     Low Back Exam:   Visual Exam: Pink, warm & dry  Physical Exam: Patient is tender to palpation in area of the bilateral lumbar paraspinal area.    Tests:    Facet Loading - Patient has positive  bilateral facet loading.  Straight Leg Raise - Patient has positive  bilateral straight leg raise.  Satish's Test/AYANA - Patient has positive  left AYANA's test.      NEUROLOGICAL:   CN 2-10 intact bilaterally   Sensory Exam: reduced sensation to light touch in LLE below the level of the knee compared to the right. Otherwise normal.   Reflex: Normal  Strength :Abnormal Hip Flexion, Bilaterally, Graded +4/5 on the left, on the right 5/5, Abnormal Hip Extension, Bilaterally, Graded +4/5 on the left on the right 5/5, Abnormal Adduction of Hip, Bilaterally, Graded +3 on the left, on the right 5/5, Abnormal Abduction of hip, Bilaterally, Graded +4/5 on the left, on the right 5/5, Abnormal Knee Flexion, Bilaterally, Graded +4/5 on the left, on the right 5/5, Normal Knee Extension, Bilaterally, Graded +5/5, Normal Ankle Plantar Flexion, Bilaterally, Graded +5/5, Normal Ankle Dorsiflexion, Bilaterally, Graded +5/5, Normal Dorsiflexion of Great Toe, Bilaterally, Graded +5/5        Imaging      No orders to display         Orders Placed This Encounter   Procedures   • Ambulatory referral to Physical Therapy

## 2024-02-06 ENCOUNTER — EVALUATION (OUTPATIENT)
Dept: PHYSICAL THERAPY | Facility: CLINIC | Age: 49
End: 2024-02-06
Payer: COMMERCIAL

## 2024-02-06 DIAGNOSIS — M54.42 CHRONIC LEFT-SIDED LOW BACK PAIN WITH LEFT-SIDED SCIATICA: Primary | ICD-10-CM

## 2024-02-06 DIAGNOSIS — G89.29 CHRONIC LEFT-SIDED LOW BACK PAIN WITH LEFT-SIDED SCIATICA: Primary | ICD-10-CM

## 2024-02-06 PROCEDURE — 97162 PT EVAL MOD COMPLEX 30 MIN: CPT
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
## 2024-02-08 ENCOUNTER — OFFICE VISIT (OUTPATIENT)
Dept: PHYSICAL THERAPY | Facility: CLINIC | Age: 49
End: 2024-02-08
Payer: COMMERCIAL

## 2024-02-08 DIAGNOSIS — M54.42 CHRONIC LEFT-SIDED LOW BACK PAIN WITH LEFT-SIDED SCIATICA: Primary | ICD-10-CM

## 2024-02-08 DIAGNOSIS — G89.29 CHRONIC LEFT-SIDED LOW BACK PAIN WITH LEFT-SIDED SCIATICA: Primary | ICD-10-CM

## 2024-02-08 PROCEDURE — 97110 THERAPEUTIC EXERCISES: CPT

## 2024-02-08 PROCEDURE — 97112 NEUROMUSCULAR REEDUCATION: CPT

## 2024-02-08 NOTE — PROGRESS NOTES
Daily Note     Today's date: 2024  Patient name: Loyd Mackay  : 1975  MRN: [id number]  Referring provider: Bishnu Ayala MD  Dx:   Encounter Diagnosis     ICD-10-CM    1. Chronic left-sided low back pain with left-sided sciatica  M54.42     G89.29                      Subjective: Pt arrives w/ 10/10 pain today, L side low back to L side posterior knee. Feels like a burning sensation.       Objective: no pattern of motion (ext, shift correction, flexion, seated shift progressions) is able to alleviate pain - ext and side shifting increase numbness into L leg. Pt has difficulty activating trunk stabilizers s/t pain throughout session.       Assessment: Tolerated treatment poor. Patient demonstrated fatigue post treatment and would benefit from continued PT. Not successful in alleviating sx today. He has no clear pattern of pain relief, shows good initial ability to activate core stabilizers, pain quickly increases and is unable to sustain contraction. Reviewed HEP, concept of peripheralization vs centralization, good understanding. Will look for pattern of motion that decreases pain over weekend. Increase intensity as sx allow.       Plan: Continue per plan of care. Re-check pain patterns w/ spinal motions      Precautions standard     POC expires Auth Status Total   Visits  Start date  Expiration date PT/OT + Visit Limit? Co-Insurance    Auth #[id number] approved 12 visits. 24 to 24                                                   Visit/Unit Tracking  AUTH Status:  Date               Visits  Authed:  Used                Remaining                      Dummy Auth Tracking  1 2 3 4 5 6   7 8 9 10 11 12   13 14 15 16 17 18   19 20 21 22 23 24   25 26 27 28 29 30   31 32 33 34 35 36         Date (Visit #) IE  (1)  (2) (3) (4) (5)   DTM and TPR        Lumbar mobs        Lumbar Traction                Exercise Diary              Ther Ex        Active w/u             HS/glute/piri/HF  stretching    tested          Mobility (LTR, open books, cat/cow)  X10 total B      Rows/ext  Blue ext 2x10       Hip stability        L side hip shift correct  X6 total (pain)      Standing lumbar ext  Wall sag x6 (pain, numbness in leg)                              Neuro Re Ed        TrA progressions     isos only x10    Standing TrA and glute setting x 3-4 mins          Bridge progressions    unable - pain         Squat progressions    at bar 2x8         Hip abd progressions             Balance        Seated hip shift on pball  X10-15      HEP and POC review   X3-4 mins         Functional review, periph/central discussion x 8 mins                      Ther Act             Stairs             Functional Transfers             Functional Lifting                                                                     Modalities              heat               ice               mechanical

## 2024-02-13 ENCOUNTER — OFFICE VISIT (OUTPATIENT)
Dept: PHYSICAL THERAPY | Facility: CLINIC | Age: 49
End: 2024-02-13
Payer: COMMERCIAL

## 2024-02-13 DIAGNOSIS — G89.29 CHRONIC LEFT-SIDED LOW BACK PAIN WITH LEFT-SIDED SCIATICA: Primary | ICD-10-CM

## 2024-02-13 DIAGNOSIS — M54.42 CHRONIC LEFT-SIDED LOW BACK PAIN WITH LEFT-SIDED SCIATICA: Primary | ICD-10-CM

## 2024-02-13 PROCEDURE — 97112 NEUROMUSCULAR REEDUCATION: CPT | Performed by: PHYSICAL THERAPIST

## 2024-02-13 PROCEDURE — 97530 THERAPEUTIC ACTIVITIES: CPT | Performed by: PHYSICAL THERAPIST

## 2024-02-13 PROCEDURE — 97110 THERAPEUTIC EXERCISES: CPT | Performed by: PHYSICAL THERAPIST

## 2024-02-13 NOTE — PROGRESS NOTES
Daily Note     Today's date: 2024  Patient name: Loyd Mackay  : 1975  MRN: [id number]  Referring provider: Bishnu Ayala MD  Dx:   Encounter Diagnosis     ICD-10-CM    1. Chronic left-sided low back pain with left-sided sciatica  M54.42     G89.29                      Subjective: Pt reports pain cont to perisst and not change positively with any motions. Pt discusses he pushes himself       Objective: See treatment diary below. Inc time spent discussing pt's activity at home, pt is generally on his feet until he has no choice but to sit, he feels relaxation when he lies on his L side, but does not seek this position often during the day. Inc time spent PNE edu related to reducing sensitivity of pain threshold and importance of preventing spikes       Assessment: Tolerated treatment fair. Patient is well motivated at this time however symptoms do not generally follow a mechanical pattern and may indeed be chemical and inflammatory in nature. Overall he would benefit from graded exposure and intermittent reductive strategies which at this time do not follow a direction of preference but more a position of preference (L SL with fulcrum under waist to prevent curvature in spine deviating from neutral. Encouraged pt to cycle through reductive positions regularly and to do so prior to his pain increasing to a point in which he is forced to abandon standing or walking.       Plan: Continue per plan of care.      Precautions standard     POC expires Auth Status Total   Visits  Start date  Expiration date PT/OT + Visit Limit? Co-Insurance    Auth #[id number] approved 12 visits. 24 to 24                                                   Visit/Unit Tracking  AUTH Status:  Date               Visits  Authed:  Used                Remaining                      Dummy Auth Tracking  1 2 3 4 5 6   7 8 9 10 11 12   13 14 15 16 17 18   19 20 21 22 23 24   25 26 27 28 29 30   31 32 33 34 35 36         Date  (Visit #) IE 2/6 (1) 2/8 (2) 2/13(3) (4) (5)   DTM and TPR        Lumbar mobs        Lumbar Traction                Exercise Diary              Ther Ex        Active w/u             HS/glute/piri/HF stretching    tested          Mobility (LTR, open books, cat/cow)  X10 total B L SL with fulcrum under side 2x5     Rows/ext  Blue ext 2x10  Rows 3x10 Green     Hip stability        L side hip shift correct  X6 total (pain)      Standing lumbar ext  Wall sag x6 (pain, numbness in leg) Attempted SOC in SL, painful                             Neuro Re Ed        TrA progressions     isos only x10    Standing TrA and glute setting x 3-4 mins   x10       Bridge progressions    unable - pain         Squat progressions    at bar 2x8         Hip abd progressions             Balance        Seated hip shift on pball  X10-15      HEP and POC review   X3-4 mins  Inc emphasis on cycling through varied positions       Functional review, periph/central discussion x 8 mins                      Ther Act             Stairs             Functional Transfers             Functional Lifting             PNE      10'                                                  Modalities              heat               ice               mechanical

## 2024-02-15 ENCOUNTER — OFFICE VISIT (OUTPATIENT)
Dept: PHYSICAL THERAPY | Facility: CLINIC | Age: 49
End: 2024-02-15
Payer: COMMERCIAL

## 2024-02-15 DIAGNOSIS — M54.42 CHRONIC LEFT-SIDED LOW BACK PAIN WITH LEFT-SIDED SCIATICA: Primary | ICD-10-CM

## 2024-02-15 DIAGNOSIS — G89.29 CHRONIC LEFT-SIDED LOW BACK PAIN WITH LEFT-SIDED SCIATICA: Primary | ICD-10-CM

## 2024-02-15 PROCEDURE — 97110 THERAPEUTIC EXERCISES: CPT

## 2024-02-15 PROCEDURE — 97112 NEUROMUSCULAR REEDUCATION: CPT

## 2024-02-15 NOTE — PROGRESS NOTES
Daily Note     Today's date: 2/15/2024  Patient name: Loyd Mackay  : 1975  MRN: [id number]  Referring provider: Bishnu Ayala MD  Dx:   Encounter Diagnosis     ICD-10-CM    1. Chronic left-sided low back pain with left-sided sciatica  M54.42     G89.29                      Subjective: Pt reports 9/10 pain in low back to start, notes that cold makes pain worse.      Objective: no directional preference, L s/l in fetal position improves sx intermittently. Pain made worse w/ L side pallof press - pain remains increased to finish.      Assessment: Tolerated treatment poor. Patient demonstrated fatigue post treatment and would benefit from continued PT. No significant benefit to today's session. Pain was elevated throughout. L s/l can be made comfortable for short durations. Not well maintained throughout. Will continue to trial various treatment options prior to re-assessment.      Plan: Continue per plan of care. WB progressions as able      Precautions standard     POC expires Auth Status Total   Visits  Start date  Expiration date PT/OT + Visit Limit? Co-Insurance    Auth #[id number] approved 12 visits. 24 to 24                                                   Visit/Unit Tracking  AUTH Status:  Date               Visits  Authed:  Used                Remaining                      Dummy Auth Tracking  1 2 3 4 5 6   7 8 9 10 11 12   13 14 15 16 17 18   19 20 21 22 23 24   25 26 27 28 29 30   31 32 33 34 35 36         Date (Visit #) IE  (1)  (2)  (3) 2/15 (4) (5)   DTM and TPR        Lumbar mobs        Lumbar Traction                Exercise Diary              Ther Ex        Active w/u             HS/glute/piri/HF stretching    tested     no      Mobility (LTR, open books, cat/cow)  X10 total B L SL with fulcrum under side 2x5 L fetal s/l x 4-5 mins total    Rows/ext  Blue ext 2x10  Rows 3x10 Green Black 2x15 each    Hip stability        L side hip shift correct  X6 total (pain)       Standing lumbar ext  Wall sag x6 (pain, numbness in leg) Attempted SOC in SL, painful rev                            Neuro Re Ed        TrA progressions     isos only x10    Standing TrA and glute setting x 3-4 mins   x10  isos w/ ad sq x10      Bridge progressions    unable - pain    x10-15      Squat progressions    at bar 2x8         Hip abd progressions        pallof green 2x10 on R, x10 on L     Balance        Seated hip shift on pball  X10-15      HEP and POC review   X3-4 mins  Inc emphasis on cycling through varied positions       Functional review, periph/central discussion x 8 mins  Pain discussion x 8 mins                    Ther Act             Stairs             Functional Transfers             Functional Lifting             PNE      10'                                                  Modalities              heat               ice               mechanical

## 2024-02-20 ENCOUNTER — OFFICE VISIT (OUTPATIENT)
Dept: PHYSICAL THERAPY | Facility: CLINIC | Age: 49
End: 2024-02-20
Payer: COMMERCIAL

## 2024-02-20 DIAGNOSIS — G89.29 CHRONIC LEFT-SIDED LOW BACK PAIN WITH LEFT-SIDED SCIATICA: Primary | ICD-10-CM

## 2024-02-20 DIAGNOSIS — M54.42 CHRONIC LEFT-SIDED LOW BACK PAIN WITH LEFT-SIDED SCIATICA: Primary | ICD-10-CM

## 2024-02-20 PROCEDURE — 97110 THERAPEUTIC EXERCISES: CPT

## 2024-02-20 PROCEDURE — 97112 NEUROMUSCULAR REEDUCATION: CPT

## 2024-02-20 NOTE — PROGRESS NOTES
"Daily Note     Today's date: 2024  Patient name: Loyd Mackay  : 1975  MRN: [id number]  Referring provider: Bishnu Ayala MD  Dx:   Encounter Diagnosis     ICD-10-CM    1. Chronic left-sided low back pain with left-sided sciatica  M54.42     G89.29           Start Time: 1500  Stop Time: 1530  Total time in clinic (min): 30 minutes    Subjective: \"Today is a pretty bad day.\" Patient reports numbness/tingling to left foot at the start of today's session. States he spent most of today laying down on his left side.      Objective:       Assessment: Tolerated treatment poor. Patient with inconsistent responses to mechanical during today's session, although modest centralization noted with right flexion rotation (in left sidelying); HEP updated to reflect and pt encouraged to perform at frequency of 10 reps, 3x/day to assess symptom response, pt verbalized good understanding/agreement. Patient with significant deficits in tolerance to exercise throughout today's session. Unable to arjun supine activities due to reports of pain at scar from prior spinal surgery. Reduced resistance w/ standing progressions, although unable to arjun similar reps/sets compared to prior sessions. High symptom irritability raises concern for inflammatory component of pain vs mechanical.      Plan: Continue per plan of care.      Precautions standard       *Prefers [facility]*    POC expires Auth Status Total   Visits  Start date  Expiration date PT/OT + Visit Limit? Co-Insurance    Auth #[id number] approved 12 visits. 24 to 24                                                 Visit/Unit Tracking  AUTH Status:  Date               Visits  Authed:  Used                Remaining                      Dummy Auth Tracking  1 2 3 4 5 6   7 8 9 10 11 12   13 14 15 16 17 18   19 20 21 22 23 24   25 26 27 28 29 30   31 32 33 34 35 36         Date (Visit #) IE  (1)  (2)  (3) 2/15 (4)  (5)   DTM and TPR        Lumbar mobs "        Lumbar Traction                Exercise Diary              Ther Ex        Active w/u             HS/glute/piri/HF stretching    tested     no      Mobility (LTR, open books, cat/cow)  X10 total B L SL with fulcrum under side 2x5 L fetal s/l x 4-5 mins total L SL w/ intermittent flexion rotation 3x10 total - modest centralization noted    Trial SANTANA: worse    Trial R SGIS: worse   Rows/ext  Blue ext 2x10  Rows 3x10 Green Black 2x15 each GTB x10 each   Hip stability        L side hip shift correct  X6 total (pain)      Standing lumbar ext  Wall sag x6 (pain, numbness in leg) Attempted SOC in SL, painful rev                            Neuro Re Ed        TrA progressions     isos only x10    Standing TrA and glute setting x 3-4 mins   x10  isos w/ ad sq x10   not arjun   Bridge progressions    unable - pain    x10-15   not arjun    Squat progressions    at bar 2x8         Hip abd progressions        pallof green 2x10 on R, x10 on L  Palloff press  GTB  X6 on left  X8 on right   Balance        Seated hip shift on pball  X10-15      HEP and POC review   X3-4 mins  Inc emphasis on cycling through varied positions       Functional review, periph/central discussion x 8 mins  Pain discussion x 8 mins                    Ther Act             Stairs             Functional Transfers             Functional Lifting             PNE      10'                                                  Modalities              heat               ice               mechanical

## 2024-02-22 ENCOUNTER — OFFICE VISIT (OUTPATIENT)
Dept: PHYSICAL THERAPY | Facility: CLINIC | Age: 49
End: 2024-02-22
Payer: COMMERCIAL

## 2024-02-22 DIAGNOSIS — M54.42 CHRONIC LEFT-SIDED LOW BACK PAIN WITH LEFT-SIDED SCIATICA: Primary | ICD-10-CM

## 2024-02-22 DIAGNOSIS — G89.29 CHRONIC LEFT-SIDED LOW BACK PAIN WITH LEFT-SIDED SCIATICA: Primary | ICD-10-CM

## 2024-02-22 PROCEDURE — 97110 THERAPEUTIC EXERCISES: CPT

## 2024-02-22 PROCEDURE — 97112 NEUROMUSCULAR REEDUCATION: CPT

## 2024-02-22 NOTE — PROGRESS NOTES
Daily Note     Today's date: 2024  Patient name: Loyd Mackay  : 1975  MRN: [id number]  Referring provider: Bisnhu Ayala MD  Dx:   Encounter Diagnosis     ICD-10-CM    1. Chronic left-sided low back pain with left-sided sciatica  M54.42     G89.29                      Subjective: Pt notes no change in sx. Pain elevated to start. Notes that static positioning is difficult, prolonged activity also provokes pain. Still has not keyed into a specific pattern that alleviates pain.      Objective: pain increase immediately w/ all supine positioning regardless of leg position, made slightly better in upright positions.      Assessment: Tolerated treatment poor. Patient demonstrated fatigue post treatment and would benefit from continued PT. No change in sx to end session. Antwan at a 9/10 to finish. Defers further resisted exercise as this will likely exacerbate sx. Plan to re-assess in one week to determine progress (or lack thereof) thus far.       Plan: Continue per plan of care. Re-eval in two visits     Precautions standard       *Prefers [facility]*    POC expires Auth Status Total   Visits  Start date  Expiration date PT/OT + Visit Limit? Co-Insurance    Auth #[id number] approved 12 visits. 24 to 24                                                 Visit/Unit Tracking  AUTH Status:  Date               Visits  Authed:  Used                Remaining                      Dummy Auth Tracking  1 2 3 4 5 6   7 8 9 10 11 12   13 14 15 16 17 18   19 20 21 22 23 24   25 26 27 28 29 30   31 32 33 34 35 36         Date (Visit #) IE  (1)  (2)  (3) 2/15 (4)  (5)  (6)     DTM and TPR                      Lumbar mobs           Lumbar Traction                      Exercise Diary                 Ther Ex           Active w/u                HS/glute/piri/HF stretching    tested     no         Mobility (LTR, open books, cat/cow)  X10 total B L SL with fulcrum under side 2x5 L fetal s/l x 4-5  mins total L SL w/ intermittent flexion rotation 3x10 total - modest centralization noted    Trial SANTANA: worse    Trial R SGIS: worse L s/l w/ heat x 7 mins     Rows/ext  Blue ext 2x10  Rows 3x10 Green Black 2x15 each GTB x10 each Blue x20 each      Hip stability           L side hip shift correct  X6 total (pain)    LTR attempted x 10     Standing lumbar ext  Wall sag x6 (pain, numbness in leg) Attempted SOC in SL, painful rev                                        Neuro Re Ed           TrA progressions     isos only x10    Standing TrA and glute setting x 3-4 mins   x10  isos w/ ad sq x10   not arjun Attempted      Bridge progressions    unable - pain    x10-15   not arjun  Not tolerated      Squat progressions    at bar 2x8            Hip abd progressions        pallof green 2x10 on R, x10 on L  Palloff press  GTB  X6 on left  X8 on right Unable      Balance           Seated hip shift on pball  X10-15         HEP and POC review   X3-4 mins  Inc emphasis on cycling through varied positions          Functional review, periph/central discussion x 8 mins  Pain discussion x 8 mins  Rev of POC x 8 mins                           Ther Act                Stairs                Functional Transfers                Functional Lifting                PNE      10'                                                              Modalities                 heat                  ice                  mechanical

## 2024-02-27 ENCOUNTER — OFFICE VISIT (OUTPATIENT)
Dept: PHYSICAL THERAPY | Facility: CLINIC | Age: 49
End: 2024-02-27
Payer: COMMERCIAL

## 2024-02-27 DIAGNOSIS — M54.42 CHRONIC LEFT-SIDED LOW BACK PAIN WITH LEFT-SIDED SCIATICA: Primary | ICD-10-CM

## 2024-02-27 DIAGNOSIS — G89.29 CHRONIC LEFT-SIDED LOW BACK PAIN WITH LEFT-SIDED SCIATICA: Primary | ICD-10-CM

## 2024-02-27 PROCEDURE — 97110 THERAPEUTIC EXERCISES: CPT

## 2024-02-27 PROCEDURE — 97112 NEUROMUSCULAR REEDUCATION: CPT

## 2024-02-27 NOTE — PROGRESS NOTES
Daily Note     Today's date: 2024  Patient name: Loyd Mackay  : 1975  MRN: [id number]  Referring provider: Bishnu Ayala MD  Dx:   Encounter Diagnosis     ICD-10-CM    1. Chronic left-sided low back pain with left-sided sciatica  M54.42     G89.29                      Subjective: Pt reports that today is a bad day. Cannot sit or stand for too long. Pain has been increased since Saturday, 24, morning. Notes no specific LUDMILA.       Objective: no directional preference today. Poor tolerance to any loaded hip hinge or knee flexion work today.      Assessment: Tolerated treatment poor. Patient demonstrated fatigue post treatment. Pt continues to show no significant benefit from skilled treatment. Has pain throughout. Will be re-assessed at next visit, plan to d/c at that point and await further imaging (which is scheduled per pt report) barring setback.      Plan: Continue per plan of care. Re-eval      Precautions standard       *Prefers [facility]*    POC expires Auth Status Total   Visits  Start date  Expiration date PT/OT + Visit Limit? Co-Insurance    Auth #[id number] approved 12 visits. 24 to 24                                                 Visit/Unit Tracking  AUTH Status:  Date               Visits  Authed:  Used                Remaining                      Dummy Auth Tracking  1 2 3 4 5 6   7 8 9 10 11 12   13 14 15 16 17 18   19 20 21 22 23 24   25 26 27 28 29 30   31 32 33 34 35 36         Date (Visit #) IE  (1)  (2)  (3) 2/15 (4)  (5)  (6)  (7)    DTM and TPR                      Lumbar mobs           Lumbar Traction                      Exercise Diary                 Ther Ex           Active w/u                HS/glute/piri/HF stretching    tested     no         Mobility (LTR, open books, cat/cow)  X10 total B L SL with fulcrum under side 2x5 L fetal s/l x 4-5 mins total L SL w/ intermittent flexion rotation 3x10 total - modest centralization  noted    Trial SANTANA: worse    Trial R SGIS: worse L s/l w/ heat x 7 mins no    Rows/ext  Blue ext 2x10  Rows 3x10 Green Black 2x15 each GTB x10 each Blue x20 each  Blue 2x15 each    Blue she ext w/ heel lift 2x10 B    Hip stability           L side hip shift correct  X6 total (pain)    LTR attempted x 10 X10-15     Standing lumbar ext  Wall sag x6 (pain, numbness in leg) Attempted SOC in SL, painful rev                                        Neuro Re Ed           TrA progressions     isos only x10    Standing TrA and glute setting x 3-4 mins   x10  isos w/ ad sq x10   not arjun Attempted      Bridge progressions    unable - pain    x10-15   not arjun  Not tolerated      Squat progressions    at bar 2x8            Hip abd progressions        pallof green 2x10 on R, x10 on L  Palloff press  GTB  X6 on left  X8 on right Unable      Balance           Seated hip shift on pball  X10-15         HEP and POC review   X3-4 mins  Inc emphasis on cycling through varied positions          Functional review, periph/central discussion x 8 mins  Pain discussion x 8 mins  Rev of POC x 8 mins X8 mins                          Ther Act                Stairs                Functional Transfers                Functional Lifting                PNE      10'                                                              Modalities                 heat                  ice                  mechanical

## 2024-02-29 ENCOUNTER — EVALUATION (OUTPATIENT)
Dept: PHYSICAL THERAPY | Facility: CLINIC | Age: 49
End: 2024-02-29
Payer: COMMERCIAL

## 2024-02-29 DIAGNOSIS — M54.42 CHRONIC LEFT-SIDED LOW BACK PAIN WITH LEFT-SIDED SCIATICA: Primary | ICD-10-CM

## 2024-02-29 DIAGNOSIS — G89.29 CHRONIC LEFT-SIDED LOW BACK PAIN WITH LEFT-SIDED SCIATICA: Primary | ICD-10-CM

## 2024-02-29 PROCEDURE — 97110 THERAPEUTIC EXERCISES: CPT

## 2024-02-29 NOTE — PROGRESS NOTES
Daily Note     Today's date: 2024  Patient name: Loyd Mackay  : 1975  MRN: [id number]  Referring provider: Bishnu Ayala MD  Dx:   Encounter Diagnosis     ICD-10-CM    1. Chronic left-sided low back pain with left-sided sciatica  M54.42     G89.29                      Subjective: Pt notes no improvement since starting skilled PT. Notes that his back is more bothersome, pain has increased, feels that PT is not helping. Would like to wait for MRI before receiving more treatment. Update below:      Goals  Short term goals (3 weeks): NO PROGRESS  1) Pt will improve thoracolumbar mobility deficits by 25% pain free.  2) Pt will improve B LE and core strength deficits by 1/3 grade MMT.  3) Pt will improve pain at worse to <5/10.   4) Pt will initiate and progress HEP w/ special emphasis on functional core control and thoracolumbar mobility.    Long term goals (6 weeks) NO PROGRESS  1) Pt will improve FOTO to at least 48.  2) Pt will sleep through the night in positioning of choosing 6/7 nights a week w/o waking due to pain.  3) Pt will ambulate community distances and perform all household activities for two full weeks w/ sx in low back and L leg <4/10 throughout.   4) Pt will be independent and compliant w/ HEP in order to maximize functional benefit of skilled PT following d/c.     Pain  Current pain rating: 10  At best pain ratin-7  At worst pain rating: 10  Location: across low back, radiates into R hip, shoots down L LE, pins and needls down L leg, L foot is cold  Quality: throbbing, tight and dull ache  Relieving factors: heat, rest, change in position and medications (hot shower, heating pad - movement feels better, tizanidine helps w/ sleep)  Aggravating factors: lifting, overhead activity, running, stair climbing, walking and standing (forward bending and lifting, prolonged inactivity)  Progression: worsening since IE    Social Support  Steps to enter house: yes  Stairs in house: no    Lives in: one-story house  Lives with: young children (lives with son, 16 year old)    Employment status: not working (has not been able to work since 2021)  Hand dominance: left    Treatments  Previous treatment: physical therapy and injection treatment        Objective     Concurrent Complaints  Negative for bladder dysfunction    Additional Special Questions  Pt notes pressure changes when lying prone, feels like he has to defecate    Notes dribbling at times when trying to urinate    Notes that he has dealt with this in the past but this has been more apparent since recent fall.    Discussed pelvic floor therapy and men's health therapy with pt, notes he is aware of these services but declined referral at this time. Feels he is managing it on his own.     -2/29: does not mention these sx    Postural Observations  Seated posture: poor  Standing posture: poor  Correction of posture: makes symptoms better      Palpation     Additional Palpation Details  Pt is TTP and has increased tissue density through L side lumbar PS and QL    Neurological Testing     Sensation     Lumbar   Left   Intact: light touch    Right   Intact: light touch    Additional Neurological Details  Diminished throughout entire L LE, from mid quad through foot, does not seem specific to dermatome but dullness worsens around medial ankle and big toe    -2/29: diminished    Active Range of Motion     Additional Active Range of Motion Details  Flex: <10%*  Ext: 10%*  SB R: 25%*  SB L: 25%*  Rot R: 25%*  Rot L: 25*%    *indicates pain    Re-eval 2/29:  Flex: <5%*  Ext: 5%*  SB R: 10%*  SB L: 10%*  Rot R: 10%*  Rot L: 10*%    Strength/Myotome Testing     Additional Strength Details  LE Strength (R/L)    Hip  Flex 4/5 , 3+/5*  Ext 4/5 , 4-/5  Abd 4/5 , 4-/5  Add 4/5 , 4-/5    Knee   Flex 4/5 , 4/5  Ext 4/5 , 4/5    Core Strength: 1/5 w/ cueing required in order to perform lumbar neutral properly     Pain and difficulty w/ L side  testing    *Indicates pain    -2/29: no change    Tests     Additional Tests Details  Manual lumbar traction: min relief w/ R LAD, pain is made worse w/ L LAD    Thoracolumbar joint mobility: hypo throughout    Functional Testing:   Sit<>stand: UE support w/ R side WS, requires momentum     BW squat: pain w/ R side WS through 25%    Stairs: TBA    SLS: pain on L side     Prone pillow under hips provides intermittent min relief  Unable to tolerate LAINEY w/ pillow under hips  (+) SLR on L for pain in low back  Pain w/ SKTC B L>R    -2/29: prone position not tolerated, functional status no change    Ambulation     Ambulation: Level Surfaces     Additional Level Surfaces Ambulation Details  Poor lumbar rotation, antalgic, slight R lean throughout, min antalgic    -2/29: no change      Assessment: Tolerated treatment poor. Patient demonstrated fatigue post treatment. Pt has shown no improvement over 8 visits. He has been faithful about attending scheduled visits. We will place on hold and wait for MRI. Should he require more help after 30 days, a new case will be opened. Pt in agreement w/ plan.      Plan: Wait for MRI, likely d/c     Precautions standard       *Prefers [facility]*    POC expires Auth Status Total   Visits  Start date  Expiration date PT/OT + Visit Limit? Co-Insurance    Auth #[id number] approved 12 visits. 2/6/24 to 5/6/24 7/12                                                 Visit/Unit Tracking  AUTH Status:  Date               Visits  Authed:  Used                Remaining                      Dummy Auth Tracking  1 2 3 4 5 6   7 8 9 10 11 12   13 14 15 16 17 18   19 20 21 22 23 24   25 26 27 28 29 30   31 32 33 34 35 36         Date (Visit #) IE 2/6 (1) 2/8 (2) 2/13 (3) 2/15 (4) 2/20 (5) 2/22 (6) 2/27 (7)  2/29 (8) PN   DTM and TPR                      Lumbar mobs           Lumbar Traction                      Exercise Diary                 Ther Ex           Active w/u                HS/glute/piri/HF  stretching    tested     no      Re-assessment x 10 mins   Mobility (LTR, open books, cat/cow)  X10 total B L SL with fulcrum under side 2x5 L fetal s/l x 4-5 mins total L SL w/ intermittent flexion rotation 3x10 total - modest centralization noted    Trial SANTANA: worse    Trial R SGIS: worse L s/l w/ heat x 7 mins no    Rows/ext  Blue ext 2x10  Rows 3x10 Green Black 2x15 each GTB x10 each Blue x20 each  Blue 2x15 each    Blue she ext w/ heel lift 2x10 B    Hip stability           L side hip shift correct  X6 total (pain)    LTR attempted x 10 X10-15     Standing lumbar ext  Wall sag x6 (pain, numbness in leg) Attempted SOC in SL, painful rev                                        Neuro Re Ed           TrA progressions     isos only x10    Standing TrA and glute setting x 3-4 mins   x10  isos w/ ad sq x10   not arjun Attempted      Bridge progressions    unable - pain    x10-15   not arjun  Not tolerated      Squat progressions    at bar 2x8            Hip abd progressions        pallof green 2x10 on R, x10 on L  Palloff press  GTB  X6 on left  X8 on right Unable      Balance           Seated hip shift on pball  X10-15         HEP and POC review   X3-4 mins  Inc emphasis on cycling through varied positions          Functional review, periph/central discussion x 8 mins  Pain discussion x 8 mins  Rev of POC x 8 mins X8 mins                          Ther Act                Stairs                Functional Transfers                Functional Lifting                PNE      10'                                                              Modalities                 heat                  ice                  mechanical

## 2024-03-25 ENCOUNTER — OFFICE VISIT (OUTPATIENT)
Dept: PAIN MEDICINE | Facility: CLINIC | Age: 49
End: 2024-03-25
Payer: COMMERCIAL

## 2024-03-25 VITALS
WEIGHT: 199 LBS | HEART RATE: 69 BPM | HEIGHT: 72 IN | SYSTOLIC BLOOD PRESSURE: 131 MMHG | DIASTOLIC BLOOD PRESSURE: 86 MMHG | BODY MASS INDEX: 26.95 KG/M2

## 2024-03-25 DIAGNOSIS — Z13.228 SCREENING FOR METABOLIC DISORDER: ICD-10-CM

## 2024-03-25 DIAGNOSIS — M54.16 LUMBAR RADICULOPATHY: ICD-10-CM

## 2024-03-25 DIAGNOSIS — G89.4 CHRONIC PAIN SYNDROME: Primary | ICD-10-CM

## 2024-03-25 DIAGNOSIS — M96.1 LUMBAR POST-LAMINECTOMY SYNDROME: ICD-10-CM

## 2024-03-25 PROCEDURE — 99214 OFFICE O/P EST MOD 30 MIN: CPT

## 2024-03-25 NOTE — PROGRESS NOTES
Pain Medicine Follow-Up Note    Assessment:  1. Chronic pain syndrome    2. Screening for metabolic disorder    3. Lumbar radiculopathy    4. Lumbar post-laminectomy syndrome        Plan:  Orders Placed This Encounter   Procedures    MRI lumbar spine with and without contrast     Standing Status:   Future     Standing Expiration Date:   3/25/2028     Scheduling Instructions:      There is no preparation for this test. Please leave your jewelry and valuables at home, wedding rings are the exception. All patients will be required to change into a hospital gown and pants.  Street clothes are not permitted in the MRI.  Magnetic nail polish must be removed prior to arrival for your test. Please bring your insurance cards, a form of photo ID and a list of your medications with you. Arrive 15 minutes prior to your appointment time in order to register. Please bring any prior CT or MRI studies of this area that were not performed at a [facility].            To schedule this appointment, please contact Central Scheduling at [phone].            Prior to your appointment, please make sure you complete the MRI Screening Form when you e-Check in for your appointment. This will be available starting 7 days before your appointment in [employer]. You may receive an e-mail with an activation code if you do not have a [employer] account. If you do not have access to a device, we will complete your screening at your appointment.     Order Specific Question:   What is the patient's sedation requirement? If Medication for Claustrophobia is selected, order medication at this point.     Answer:   No Sedation     Order Specific Question:   Does this procedure require the 3T MRI at [place] or [place]?     Answer:   No     Order Specific Question:   Release to patient through [facility]     Answer:   Immediate     Order Specific Question:   Is order priority selected as STAT?     Answer:   No     Order Specific Question:   Reason for Exam      Answer:   low back pain, lumbar radiculopathy even after PT    Basic metabolic panel     This is a patient instruction: Patient fasting for 8 hours or longer recommended.     Standing Status:   Future     Number of Occurrences:   1     Standing Expiration Date:   3/25/2025     My impressions and treatment recommendations were discussed in detail with the patient who verbalized understanding and had no further questions.      Patient returns the office after doing physical therapy on his low back for lumbar radiculopathy.  Patient states that his pain is worse since doing physical therapy.  I recommend that the patient have an MRI with and without contrast since the patient has had back surgery in the past.  Patient's last blood work in the system is from 2021 I will order a BMP to be done 2 weeks prior to MRI.  Patient is in agreement with this plan.    Follow-up is planned in after completion of MRI time or sooner as warranted.  Discharge instructions were provided. I personally saw and examined the patient and I agree with the above discussed plan of care.    History of Present Illness:    Loyd Mackay is a 48 y.o. male who presents to [facility] for interval re-evaluation of the above stated pain complaints. The patient has a past medical and chronic pain history as outlined in the assessment section. He was last seen on 2/1/2024.    At today's visit patient states that their pain symptoms are worse with a pain score of 7/10 on the verbal numeric pain scale.  The patient's pain is worse all the time.  The patient's pain is constant in nature.  And the quality of the patient's pain is described as burning, dull-aching, throbbing, pressure-like, shooting, numbness, and pins-and-needles.  The patient's pain is located in the bilateral low back, right buttock, and down his left posterior leg.  The patient states that the amount of pain relief he was obtaining from his pain relievers  was not enough to make a difference in his life since it did not provide him any pain relief therefore he discontinued.  Patient in the past has used duloxetine, tramadol, tizanidine, and prednisone.    Other than as stated above, the patient denies any interval changes in medications, medical condition, mental condition, symptoms, or allergies since the last office visit.         Review of Systems:    Review of Systems   Constitutional:  Negative for unexpected weight change.   HENT:  Negative for ear pain.    Eyes:  Negative for visual disturbance.   Respiratory:  Negative for shortness of breath and wheezing.    Gastrointestinal:  Negative for abdominal pain.   Musculoskeletal:  Positive for back pain and gait problem.        Decreased ROM, pain in the lower back that radiates down the leg   Neurological:  Positive for dizziness, weakness, light-headedness and numbness.   Psychiatric/Behavioral:  Positive for decreased concentration, dysphoric mood and sleep disturbance.          Past Medical History:   Diagnosis Date    Chronic back pain        Past Surgical History:   Procedure Laterality Date    EPIDURAL BLOCK INJECTION Left 11/5/2021    Procedure: L5 S1 transforaminal epdirual steroid injection ( [id number]);  Surgeon: Dudley Mclain MD;  Location: [facility] MAIN OR;  Service: Pain Management     EPIDURAL BLOCK INJECTION Left 12/29/2021    Procedure: L5 S1 transforaminal epidural steroid injection ( [id number]);  Surgeon: Dudley Mclain MD;  Location: [facility] MAIN OR;  Service: Pain Management     LUMBAR FUSION         History reviewed. No pertinent family history.    Social History     Occupational History    Not on file   Tobacco Use    Smoking status: Every Day     Current packs/day: 1.00     Types: Cigarettes    Smokeless tobacco: Never    Tobacco comments:     1 pack a day    Vaping Use    Vaping status: Never Used   Substance and Sexual Activity    Alcohol use: Never    Drug use: Never    Sexual activity:  Not on file         Current Outpatient Medications:     albuterol (PROVENTIL HFA,VENTOLIN HFA) 90 mcg/act inhaler, Inhale 2 puffs every 4 (four) hours as needed for wheezing or shortness of breath (Patient not taking: Reported on 2/1/2024), Disp: 18 g, Rfl: 0    tiZANidine (ZANAFLEX) 2 mg tablet, Take 1 tablet (2 mg total) by mouth every 8 (eight) hours as needed (mild to moderate pain), Disp: 90 tablet, Rfl: 0    No Known Allergies    Physical Exam:    /86   Pulse 69   Ht 6' (1.829 m)   Wt 90.3 kg (199 lb)   BMI 26.99 kg/m²     Constitutional:normal, well developed, well nourished, alert, in no distress and non-toxic and no overt pain behavior. and patient unable to sit needing to stand and walk around room due to pain  Eyes:anicteric  HEENT:grossly intact  Neck:supple, symmetric, trachea midline and no masses   Pulmonary:even and unlabored  Cardiovascular:No edema or pitting edema present  Skin:Normal without rashes or lesions and well hydrated  Psychiatric:Mood and affect appropriate  Neurologic:Cranial Nerves II-XII grossly intact  Musculoskeletal:antalgic gait      Imaging  MRI lumbar spine with and without contrast    (Results Pending)         Orders Placed This Encounter   Procedures    MRI lumbar spine with and without contrast    Basic metabolic panel

## 2024-04-18 ENCOUNTER — HOSPITAL ENCOUNTER (OUTPATIENT)
Dept: RADIOLOGY | Facility: HOSPITAL | Age: 49
Discharge: HOME | End: 2024-04-18
Payer: OTHER MISCELLANEOUS

## 2024-04-18 DIAGNOSIS — M54.16 LUMBAR RADICULOPATHY: ICD-10-CM

## 2024-04-18 DIAGNOSIS — M96.1 LUMBAR POST-LAMINECTOMY SYNDROME: ICD-10-CM

## 2024-04-18 PROCEDURE — A9585 GADOBUTROL INJECTION: HCPCS

## 2024-04-18 PROCEDURE — 72158 MRI LUMBAR SPINE W/O & W/DYE: CPT

## 2024-04-18 RX ORDER — GADOBUTROL 604.72 MG/ML
9 INJECTION INTRAVENOUS
Status: COMPLETED | OUTPATIENT
Start: 2024-04-18 | End: 2024-04-18

## 2024-04-18 RX ADMIN — GADOBUTROL 9 ML: 604.72 INJECTION INTRAVENOUS at 08:17

## 2024-04-25 ENCOUNTER — TELEPHONE (OUTPATIENT)
Dept: PAIN MEDICINE | Facility: CLINIC | Age: 49
End: 2024-04-25

## 2024-04-25 NOTE — TELEPHONE ENCOUNTER
S/w pt , advised. He declines recommendation for injection as he states in the past he had them, they were painful and did not help

## 2024-04-25 NOTE — TELEPHONE ENCOUNTER
----- Message from PETRA Garcia sent at 4/24/2024  7:24 PM EDT -----  MRI showed small central disc extrusion at L4-L5 with left recess narrowing possibly impacting nerve root.  There is also a left disc bulge at L5-S1 without nerve compression.  You would likely benefit from a left TFESI at L4-L5, L5-S1.

## 2024-05-07 NOTE — TELEPHONE ENCOUNTER
Caller: Loyd     Doctor: jhony     Reason for call: spoke with pt who would like to speak with the nurse to discuss other options that he may have outside of having another procedure. Pt doesn't believe they work and would like to hear the alternative     Call back#: [phone]

## 2024-05-08 ENCOUNTER — OFFICE VISIT (OUTPATIENT)
Dept: PAIN MEDICINE | Facility: CLINIC | Age: 49
End: 2024-05-08
Payer: COMMERCIAL

## 2024-05-08 ENCOUNTER — TELEPHONE (OUTPATIENT)
Dept: PAIN MEDICINE | Facility: CLINIC | Age: 49
End: 2024-05-08

## 2024-05-08 VITALS
HEART RATE: 84 BPM | BODY MASS INDEX: 27.09 KG/M2 | SYSTOLIC BLOOD PRESSURE: 145 MMHG | HEIGHT: 72 IN | WEIGHT: 200 LBS | DIASTOLIC BLOOD PRESSURE: 75 MMHG

## 2024-05-08 DIAGNOSIS — M54.41 CHRONIC BILATERAL LOW BACK PAIN WITH BILATERAL SCIATICA: ICD-10-CM

## 2024-05-08 DIAGNOSIS — M96.1 LUMBAR POST-LAMINECTOMY SYNDROME: Primary | ICD-10-CM

## 2024-05-08 DIAGNOSIS — G89.29 CHRONIC BILATERAL LOW BACK PAIN WITH BILATERAL SCIATICA: ICD-10-CM

## 2024-05-08 DIAGNOSIS — M54.42 CHRONIC BILATERAL LOW BACK PAIN WITH BILATERAL SCIATICA: ICD-10-CM

## 2024-05-08 DIAGNOSIS — M54.16 LUMBAR RADICULOPATHY: ICD-10-CM

## 2024-05-08 DIAGNOSIS — R29.898 BILATERAL LEG WEAKNESS: ICD-10-CM

## 2024-05-08 PROCEDURE — 99214 OFFICE O/P EST MOD 30 MIN: CPT

## 2024-05-08 NOTE — PROGRESS NOTES
Pain Medicine Follow-Up Note    Assessment:  1. Lumbar post-laminectomy syndrome    2. Bilateral leg weakness    3. Chronic bilateral low back pain with bilateral sciatica    4. Lumbar radiculopathy        Plan:  Orders Placed This Encounter   Procedures    Ambulatory referral to Neurosurgery     Standing Status:   Future     Standing Expiration Date:   5/8/2025     Referral Priority:   Routine     Referral Type:   Consult - AMB     Referral Reason:   Specialty Services Required     Referred to Provider:   Angel Jovel MD     Requested Specialty:   Neurosurgery     Number of Visits Requested:   1     Expiration Date:   5/8/2025       No orders of the defined types were placed in this encounter.      My impressions and treatment recommendations were discussed in detail with the patient who verbalized understanding and had no further questions.      The patient returns to the office with worsening bilateral low back pain that radiates down his posterior bilateral legs and he also has anterior leg numbness.  Reviewed patient's recent MRI in detail.     IMPRESSION:     1.  Interval left laminectomy at level L4-5. Disc bulge and small central disc extrusion with minimal caudal migration at this level. There is left greater than right lateral recess narrowing with possible impact upon L5 nerve roots (left greater than   right). Correlate for left L5 radiculopathy.  2.  Redemonstrated prior left laminectomy at level L5-S1 with mild left eccentric canal narrowing    I did discuss that I felt that he would benefit from a transforaminal epidural steroid injection however patient reports that he has had 2 of these injections in the past without benefit.  Patient also stated that they were very painful and does not wish to go through them again.      Unfortunately the patient has trialed and failed numerous medications over the last 2 years, including gabapentin, pregabalin, duloxetine, etodolac, naproxen,  cyclobenzaprine, tizanidine, tramadol, and numerous prednisone tapers.      On exam the patient does have bilateral leg weakness, I recommend that the patient revisit neurosurgery for additional options.  I will refer the patient to Dr. Mccauley with [facility] neurosurgery.  The patient is in agreement with this plan.    Follow-up is planned in as needed time or sooner as warranted.  Discharge instructions were provided. I personally saw and examined the patient and I agree with the above discussed plan of care.    History of Present Illness:    Loyd Mackay is a 48 y.o. male who presents to [facility] for interval re-evaluation of the above stated pain complaints. The patient has a past medical and chronic pain history as outlined in the assessment section. He was last seen on 3/25/2024.    At today's visit patient states that their pain symptoms are worse with a pain score of 9/10 on the verbal numeric pain scale.  The patient's pain is worse in the morning, evening, and at night.  The patient's pain is constant in nature.  And the quality of the patient's pain is described as  burning, dull-aching, sharp, throbbing, pressure-like, shooting, numbness, and pins-and-needles..  The patient's pain is located in the bilateral low back radiating down his posterior legs as well as anterior thigh numbness.     Other than as stated above, the patient denies any interval changes in medications, medical condition, mental condition, symptoms, or allergies since the last office visit.         Review of Systems:    Review of Systems   Constitutional:  Negative for unexpected weight change.   HENT:  Negative for ear pain.    Eyes:  Negative for visual disturbance.   Respiratory:  Negative for shortness of breath and wheezing.    Gastrointestinal:  Negative for abdominal pain.   Musculoskeletal:  Positive for back pain, gait problem and joint swelling.        Decreased ROM, joint and muscle pain    Neurological:  Positive for weakness, light-headedness and numbness.   Psychiatric/Behavioral:  Positive for dysphoric mood and sleep disturbance. Negative for decreased concentration. The patient is nervous/anxious.          Past Medical History:   Diagnosis Date    Chronic back pain        Past Surgical History:   Procedure Laterality Date    EPIDURAL BLOCK INJECTION Left 11/5/2021    Procedure: L5 S1 transforaminal epdirual steroid injection ( [id number]);  Surgeon: Dudley Mclain MD;  Location: [facility] MAIN OR;  Service: Pain Management     EPIDURAL BLOCK INJECTION Left 12/29/2021    Procedure: L5 S1 transforaminal epidural steroid injection ( [id number]);  Surgeon: Dudley Mclain MD;  Location: [facility] MAIN OR;  Service: Pain Management     LUMBAR FUSION         History reviewed. No pertinent family history.    Social History     Occupational History    Not on file   Tobacco Use    Smoking status: Every Day     Current packs/day: 1.00     Types: Cigarettes    Smokeless tobacco: Never    Tobacco comments:     1 pack a day    Vaping Use    Vaping status: Never Used   Substance and Sexual Activity    Alcohol use: Never    Drug use: Never    Sexual activity: Not on file         Current Outpatient Medications:     albuterol (PROVENTIL HFA,VENTOLIN HFA) 90 mcg/act inhaler, Inhale 2 puffs every 4 (four) hours as needed for wheezing or shortness of breath (Patient not taking: Reported on 2/1/2024), Disp: 18 g, Rfl: 0    tiZANidine (ZANAFLEX) 2 mg tablet, Take 1 tablet (2 mg total) by mouth every 8 (eight) hours as needed (mild to moderate pain), Disp: 90 tablet, Rfl: 0    No Known Allergies    Physical Exam:    /75   Pulse 84   Ht 6' (1.829 m)   Wt 90.7 kg (200 lb)   BMI 27.12 kg/m²     Constitutional:normal, well developed, well nourished, alert, in no distress and non-toxic and no overt pain behavior.  Eyes:anicteric  HEENT:grossly intact  Neck:supple, symmetric, trachea midline and no masses    Pulmonary:even and unlabored  Cardiovascular:No edema or pitting edema present  Skin:Normal without rashes or lesions and well hydrated  Psychiatric:Mood and affect appropriate  Neurologic:Cranial Nerves II-XII grossly intact  Musculoskeletal:antalgic gait    Lumbar Spine Exam    Appearance:  Normal lordosis  Sensory:  dimished light touch sensation, location: Anterior thigh  Motor Strength:  Left hip flexion:  4/5  Left hip extension:  4/5  Right hip flexion:  4/5  Right hip extension:  4/5  Left knee flexion:  3/5  Left knee extension:  4/5  Right knee flexion:  3/5  Right knee extension:  4/5  Left foot dorsiflexion:  4/5  Left foot plantar flexion:  4/5  Right foot dorsiflexion:  4/5  Right foot plantar flexion:  4/5      Orders Placed This Encounter   Procedures    Ambulatory referral to Neurosurgery       This document was created using speech voice recognition software.   Grammatical errors, random word insertions, pronoun errors, and incomplete sentences are an occasional consequence of this system due to software limitations, ambient noise, and hardware issues.   Any formal questions or concerns about content, text, or information contained within the body of this dictation should be directly addressed to the provider for clarification.

## 2024-05-08 NOTE — PATIENT INSTRUCTIONS
Core Strengthening Exercises   WHAT YOU NEED TO KNOW:   What do I need to know about core strengthening exercises?  Core strengthening exercises help heal and strengthen muscles of your hips, back, and abdomen to prevent reinjury. They are beginning exercises to help support your spine. Ask your healthcare provider if you need to see a physical therapist for more advanced exercises.  Do the exercises on a mat or firm surface  (not on a bed) to support your spine and avoid low back pain.     Do the exercises in the same order every time  to train your muscles to work together. Your healthcare provider will show you how to perform these exercises. Do them every day, or as directed by your healthcare provider.     Move slowly and smoothly.  Avoid fast or jerky motions.     Stop if you feel pain.  It is normal to feel some discomfort at first. Regular exercise will help decrease your discomfort over time.  How do I perform core strengthening exercises safely?  Hold each exercise for 5 seconds. When you can do the exercise without pain for 5 seconds, increase your hold to 10 to 15 seconds. When you can do the exercise without pain for 10 to 15 seconds, add the next exercise. Increase the time you hold each exercise, or repeat the exercises as directed. As you do each exercise, breathe normally. Do not hold your breath.  Abdominal bracing:  Lie on your back with your knees bent and feet flat on the floor. Place your arms in a relaxed position beside your body. Pull your belly button in toward your spine. Do not flatten or arch your back. Tighten the abdominal muscles below your belly button. Hold for 5 seconds. Begin all of your exercises with abdominal bracing. You can also practice abdominal bracing throughout the day while you are sitting or standing.           Bridging:  Lie on your back with your knees bent and feet flat on the floor. Rest your arms at your side. Tighten your buttocks, and then lift your hips 1 inch  off the floor. Hold for 5 seconds. When you can do this exercise without pain for 10 seconds, increase the distance you lift your hips. A good goal is to be able to lift your hips so that your shoulders, hips, and knees are in a straight line.           Curl up:  Lie on your back with your knees bent and feet flat on the floor. Place your hands, palms down, underneath the curve in your lower back. Next, with your elbows on the floor, lift your shoulders and chest 2 to 3 inches. Keep your head in line with your shoulders. Hold this position for 5 seconds. When you can do this exercise without pain for 10 to 15 seconds, you may add a rotation. While your shoulders and chest are lifted off the ground, turn slightly to the left and hold. Repeat on the other side.           Dead bug:  Lie on your back with your knees bent and feet flat on the floor. Place your arms in a relaxed position beside your body. Begin with abdominal bracing. Next, raise one leg, keeping your knee bent. Hold for 5 seconds. Repeat with the other leg. When you can do this exercise without pain for 10 to 15 seconds, you may raise one straight leg and hold. Repeat with the other leg.           Quadruped:  Place your hands and knees on the floor. Keep your wrists directly below your shoulders and your knees directly below your hips. Pull your belly button in toward your spine. Do not flatten or arch your back. Tighten your abdominal muscles below your belly button. Hold for 5 seconds. When you can do this exercise without pain for 10 to 15 seconds, you may extend one arm and hold. Repeat on the other side.           Side Bridge:      Standing side bridge:  Stand next to a wall and extend one arm toward the wall. Place your palm flat on the wall with your fingers pointing upward. Begin with abdominal bracing. Next, without moving your feet, slowly bend your arm to 90 degrees. Hold for 5 seconds. Repeat on the other side. When you can do this exercise  without pain for 10 to 15 seconds, you may do the bent leg side bridge on the floor.           Bent leg side bridge:  Lie on one side with your legs, hips, and shoulders in a straight line. Prop yourself up onto your forearm so your elbow is directly below your shoulder. Bend your knees back to 90 degrees. Begin with abdominal bracing. Next, lift your hips and balance yourself on your forearm and knees. Hold for 5 seconds. Repeat on the other side. When you can do this exercise without pain for 10 to 15 seconds, you may do the straight leg side bridge on the floor.           Straight leg side bridge:  Lie on one side with your legs, hips, and shoulders in a straight line. Prop yourself up onto your forearm so your elbow is directly below your shoulder. Begin with abdominal bracing. Lift your hips off the floor and balance yourself on your forearm and the outside of your flexed foot. Do not let your ankle bend sideways. Hold for 5 seconds. Repeat on the other side. When you can do this exercise without pain for 10 to 15 seconds, ask your healthcare provider for more advanced exercises.       When should I contact my healthcare provider?   Your pain becomes worse.    You have new pain.    You have questions or concerns about your condition, care, or exercise program.  CARE AGREEMENT:   You have the right to help plan your care. Learn about your health condition and how it may be treated. Discuss treatment options with your caregivers to decide what care you want to receive. You always have the right to refuse treatment. The above information is an  only. It is not intended as medical advice for individual conditions or treatments. Talk to your doctor, nurse or pharmacist before following any medical regimen to see if it is safe and effective for you.  © 2016 [employer]. Information is for End User's use only and may not be sold, redistributed or otherwise used for commercial purposes.  All illustrations and images included in CareNotes® are the copyrighted property of [email or web address] or [employer].

## 2024-05-08 NOTE — TELEPHONE ENCOUNTER
S/w pt and advised pt to call his insurance to see what neurosurgeon is in network. Nurse also advised pt to reach out to our office if an updated referral is needed.  Pt verbalized understanding and appreciative of call.

## 2024-05-08 NOTE — TELEPHONE ENCOUNTER
Caller: Loyd    Doctor: Dale    Reason for call:  he called [facility] and they don't take his insurance who do you recommend he call now?    Call back#: [phone]

## (undated) DEVICE — FLEXIBLE ADHESIVE BANDAGE,X-LARGE: Brand: CURITY

## (undated) DEVICE — WIPES BABY PAMPERS SENSITIVE 36/PK

## (undated) DEVICE — CHLORAPREP APPLICATOR TINTED 10.5ML ONE-STEP

## (undated) DEVICE — PLASTIC ADHESIVE BANDAGE: Brand: CURITY

## (undated) DEVICE — SYRINGE 5ML LL

## (undated) DEVICE — GLOVE SRG BIOGEL 7.5

## (undated) DEVICE — BANDAID WATERPROOF 1-7/8 X 4

## (undated) DEVICE — NEEDLE SPINAL 22G X 5IN QUINCKE

## (undated) DEVICE — NEEDLE BLUNT 18 G X 1 1/2 W FILTER

## (undated) DEVICE — RADIOLOGY STERILE LABELS: Brand: CENTURION

## (undated) DEVICE — SMALL NEEDLE COUNTER NEST

## (undated) DEVICE — SKIN MARKER DUAL TIP WITH RULER CAP, FLEXIBLE RULER AND LABELS: Brand: DEVON

## (undated) DEVICE — TOWEL SET X-RAY

## (undated) DEVICE — TRAY EPID CONT PERIFIX 18G X 3.5IN 5ML CLSD TIP DRAPE